# Patient Record
Sex: FEMALE | Race: OTHER | NOT HISPANIC OR LATINO | URBAN - METROPOLITAN AREA
[De-identification: names, ages, dates, MRNs, and addresses within clinical notes are randomized per-mention and may not be internally consistent; named-entity substitution may affect disease eponyms.]

---

## 2022-03-04 ENCOUNTER — INPATIENT (INPATIENT)
Facility: HOSPITAL | Age: 52
LOS: 6 days | Discharge: HOME | End: 2022-03-11
Attending: INTERNAL MEDICINE | Admitting: INTERNAL MEDICINE
Payer: COMMERCIAL

## 2022-03-04 VITALS
TEMPERATURE: 98 F | DIASTOLIC BLOOD PRESSURE: 80 MMHG | RESPIRATION RATE: 18 BRPM | SYSTOLIC BLOOD PRESSURE: 157 MMHG | WEIGHT: 179.9 LBS | HEIGHT: 63 IN | HEART RATE: 86 BPM

## 2022-03-04 LAB
ALBUMIN SERPL ELPH-MCNC: 4.1 G/DL — SIGNIFICANT CHANGE UP (ref 3.3–5)
ALP SERPL-CCNC: 108 U/L — SIGNIFICANT CHANGE UP (ref 40–120)
ALT FLD-CCNC: 32 U/L — SIGNIFICANT CHANGE UP (ref 12–78)
ANION GAP SERPL CALC-SCNC: 7 MMOL/L — SIGNIFICANT CHANGE UP (ref 5–17)
AST SERPL-CCNC: 23 U/L — SIGNIFICANT CHANGE UP (ref 15–37)
BASOPHILS # BLD AUTO: 0.03 K/UL — SIGNIFICANT CHANGE UP (ref 0–0.2)
BASOPHILS NFR BLD AUTO: 0.3 % — SIGNIFICANT CHANGE UP (ref 0–2)
BILIRUB SERPL-MCNC: 0.7 MG/DL — SIGNIFICANT CHANGE UP (ref 0.2–1.2)
BUN SERPL-MCNC: 12 MG/DL — SIGNIFICANT CHANGE UP (ref 7–23)
CALCIUM SERPL-MCNC: 10.3 MG/DL — HIGH (ref 8.5–10.1)
CHLORIDE SERPL-SCNC: 103 MMOL/L — SIGNIFICANT CHANGE UP (ref 96–108)
CO2 SERPL-SCNC: 27 MMOL/L — SIGNIFICANT CHANGE UP (ref 22–31)
CREAT SERPL-MCNC: 0.71 MG/DL — SIGNIFICANT CHANGE UP (ref 0.5–1.3)
EGFR: 104 ML/MIN/1.73M2 — SIGNIFICANT CHANGE UP
EOSINOPHIL # BLD AUTO: 0.02 K/UL — SIGNIFICANT CHANGE UP (ref 0–0.5)
EOSINOPHIL NFR BLD AUTO: 0.2 % — SIGNIFICANT CHANGE UP (ref 0–6)
FLUAV AG NPH QL: SIGNIFICANT CHANGE UP
FLUBV AG NPH QL: SIGNIFICANT CHANGE UP
GLUCOSE BLDC GLUCOMTR-MCNC: 231 MG/DL — HIGH (ref 70–99)
GLUCOSE SERPL-MCNC: 266 MG/DL — HIGH (ref 70–99)
HCG SERPL-ACNC: <1 MIU/ML — SIGNIFICANT CHANGE UP
HCT VFR BLD CALC: 43 % — SIGNIFICANT CHANGE UP (ref 34.5–45)
HGB BLD-MCNC: 14.3 G/DL — SIGNIFICANT CHANGE UP (ref 11.5–15.5)
IMM GRANULOCYTES NFR BLD AUTO: 0.7 % — SIGNIFICANT CHANGE UP (ref 0–1.5)
LACTATE SERPL-SCNC: 1.2 MMOL/L — SIGNIFICANT CHANGE UP (ref 0.7–2)
LACTATE SERPL-SCNC: 2.3 MMOL/L — HIGH (ref 0.7–2)
LIDOCAIN IGE QN: 64 U/L — LOW (ref 73–393)
LYMPHOCYTES # BLD AUTO: 1.8 K/UL — SIGNIFICANT CHANGE UP (ref 1–3.3)
LYMPHOCYTES # BLD AUTO: 18.4 % — SIGNIFICANT CHANGE UP (ref 13–44)
MCHC RBC-ENTMCNC: 27.7 PG — SIGNIFICANT CHANGE UP (ref 27–34)
MCHC RBC-ENTMCNC: 33.3 G/DL — SIGNIFICANT CHANGE UP (ref 32–36)
MCV RBC AUTO: 83.2 FL — SIGNIFICANT CHANGE UP (ref 80–100)
MONOCYTES # BLD AUTO: 0.41 K/UL — SIGNIFICANT CHANGE UP (ref 0–0.9)
MONOCYTES NFR BLD AUTO: 4.2 % — SIGNIFICANT CHANGE UP (ref 2–14)
NEUTROPHILS # BLD AUTO: 7.47 K/UL — HIGH (ref 1.8–7.4)
NEUTROPHILS NFR BLD AUTO: 76.2 % — SIGNIFICANT CHANGE UP (ref 43–77)
NRBC # BLD: 0 /100 WBCS — SIGNIFICANT CHANGE UP (ref 0–0)
PLATELET # BLD AUTO: 356 K/UL — SIGNIFICANT CHANGE UP (ref 150–400)
POTASSIUM SERPL-MCNC: 3.7 MMOL/L — SIGNIFICANT CHANGE UP (ref 3.5–5.3)
POTASSIUM SERPL-SCNC: 3.7 MMOL/L — SIGNIFICANT CHANGE UP (ref 3.5–5.3)
PROT SERPL-MCNC: 8.4 GM/DL — HIGH (ref 6–8.3)
RBC # BLD: 5.17 M/UL — SIGNIFICANT CHANGE UP (ref 3.8–5.2)
RBC # FLD: 12.7 % — SIGNIFICANT CHANGE UP (ref 10.3–14.5)
SARS-COV-2 RNA SPEC QL NAA+PROBE: SIGNIFICANT CHANGE UP
SODIUM SERPL-SCNC: 137 MMOL/L — SIGNIFICANT CHANGE UP (ref 135–145)
WBC # BLD: 9.8 K/UL — SIGNIFICANT CHANGE UP (ref 3.8–10.5)
WBC # FLD AUTO: 9.8 K/UL — SIGNIFICANT CHANGE UP (ref 3.8–10.5)

## 2022-03-04 PROCEDURE — 99223 1ST HOSP IP/OBS HIGH 75: CPT

## 2022-03-04 PROCEDURE — 99285 EMERGENCY DEPT VISIT HI MDM: CPT

## 2022-03-04 PROCEDURE — 93010 ELECTROCARDIOGRAM REPORT: CPT

## 2022-03-04 PROCEDURE — 76700 US EXAM ABDOM COMPLETE: CPT | Mod: 26

## 2022-03-04 PROCEDURE — 74177 CT ABD & PELVIS W/CONTRAST: CPT | Mod: 26,MA

## 2022-03-04 RX ORDER — ACETAMINOPHEN 500 MG
650 TABLET ORAL ONCE
Refills: 0 | Status: COMPLETED | OUTPATIENT
Start: 2022-03-04 | End: 2022-03-04

## 2022-03-04 RX ORDER — SODIUM CHLORIDE 9 MG/ML
1000 INJECTION, SOLUTION INTRAVENOUS
Refills: 0 | Status: DISCONTINUED | OUTPATIENT
Start: 2022-03-04 | End: 2022-03-05

## 2022-03-04 RX ORDER — MORPHINE SULFATE 50 MG/1
4 CAPSULE, EXTENDED RELEASE ORAL ONCE
Refills: 0 | Status: DISCONTINUED | OUTPATIENT
Start: 2022-03-04 | End: 2022-03-04

## 2022-03-04 RX ORDER — ONDANSETRON 8 MG/1
4 TABLET, FILM COATED ORAL ONCE
Refills: 0 | Status: COMPLETED | OUTPATIENT
Start: 2022-03-04 | End: 2022-03-04

## 2022-03-04 RX ORDER — SODIUM CHLORIDE 9 MG/ML
1000 INJECTION INTRAMUSCULAR; INTRAVENOUS; SUBCUTANEOUS ONCE
Refills: 0 | Status: COMPLETED | OUTPATIENT
Start: 2022-03-04 | End: 2022-03-04

## 2022-03-04 RX ORDER — IBUPROFEN 200 MG
400 TABLET ORAL ONCE
Refills: 0 | Status: COMPLETED | OUTPATIENT
Start: 2022-03-04 | End: 2022-03-04

## 2022-03-04 RX ORDER — FAMOTIDINE 10 MG/ML
20 INJECTION INTRAVENOUS ONCE
Refills: 0 | Status: COMPLETED | OUTPATIENT
Start: 2022-03-04 | End: 2022-03-04

## 2022-03-04 RX ORDER — PANTOPRAZOLE SODIUM 20 MG/1
40 TABLET, DELAYED RELEASE ORAL DAILY
Refills: 0 | Status: DISCONTINUED | OUTPATIENT
Start: 2022-03-04 | End: 2022-03-07

## 2022-03-04 RX ORDER — ONDANSETRON 8 MG/1
4 TABLET, FILM COATED ORAL EVERY 6 HOURS
Refills: 0 | Status: DISCONTINUED | OUTPATIENT
Start: 2022-03-04 | End: 2022-03-07

## 2022-03-04 RX ORDER — NICOTINE POLACRILEX 2 MG
1 GUM BUCCAL DAILY
Refills: 0 | Status: DISCONTINUED | OUTPATIENT
Start: 2022-03-04 | End: 2022-03-07

## 2022-03-04 RX ADMIN — SODIUM CHLORIDE 1000 MILLILITER(S): 9 INJECTION INTRAMUSCULAR; INTRAVENOUS; SUBCUTANEOUS at 15:43

## 2022-03-04 RX ADMIN — ONDANSETRON 4 MILLIGRAM(S): 8 TABLET, FILM COATED ORAL at 20:33

## 2022-03-04 RX ADMIN — MORPHINE SULFATE 4 MILLIGRAM(S): 50 CAPSULE, EXTENDED RELEASE ORAL at 16:41

## 2022-03-04 RX ADMIN — Medication 20 MILLIGRAM(S): at 18:59

## 2022-03-04 RX ADMIN — PANTOPRAZOLE SODIUM 40 MILLIGRAM(S): 20 TABLET, DELAYED RELEASE ORAL at 20:33

## 2022-03-04 RX ADMIN — Medication 400 MILLIGRAM(S): at 23:48

## 2022-03-04 RX ADMIN — Medication 400 MILLIGRAM(S): at 22:48

## 2022-03-04 RX ADMIN — SODIUM CHLORIDE 1000 MILLILITER(S): 9 INJECTION INTRAMUSCULAR; INTRAVENOUS; SUBCUTANEOUS at 14:18

## 2022-03-04 RX ADMIN — MORPHINE SULFATE 4 MILLIGRAM(S): 50 CAPSULE, EXTENDED RELEASE ORAL at 15:50

## 2022-03-04 RX ADMIN — MORPHINE SULFATE 4 MILLIGRAM(S): 50 CAPSULE, EXTENDED RELEASE ORAL at 14:18

## 2022-03-04 RX ADMIN — SODIUM CHLORIDE 125 MILLILITER(S): 9 INJECTION, SOLUTION INTRAVENOUS at 20:01

## 2022-03-04 RX ADMIN — ONDANSETRON 4 MILLIGRAM(S): 8 TABLET, FILM COATED ORAL at 14:17

## 2022-03-04 RX ADMIN — FAMOTIDINE 20 MILLIGRAM(S): 10 INJECTION INTRAVENOUS at 17:02

## 2022-03-04 NOTE — ED PROVIDER NOTE - ATTENDING CONTRIBUTION TO CARE
jasen is presenting with sharp upper abdominal pain for 1 day assoc with vomiting. intermittently has had this pain for years. no diarrhea or constipation. uncomfortable appearing on exam, moderate ewpigastric ttp. CT obtained showing gallstones, US shows the same. no ruq ttp. abdominal pain is uncontorlled in ED, will require admission for intractable abdominal painl. no laboratory or clinial signs of acute cholcystitis

## 2022-03-04 NOTE — ED ADULT NURSE REASSESSMENT NOTE - NS ED NURSE REASSESS COMMENT FT1
Assuming patient's care for coverage. Report received from RN and patient informed during rounding. Assessment available on KB. Will continue to monitor patient denies pregnancy at this time

## 2022-03-04 NOTE — ED PROVIDER NOTE - CLINICAL SUMMARY MEDICAL DECISION MAKING FREE TEXT BOX
52yo female with pmh of gastritis presents complanining of sudden onset sharp epigastric pain associated with n/v, po intolerance since this AM. Tearful, in apparent pain. Vitals stables. Exam only sig for epigastric ttp. Will get labs and CTAP. Will give pain control and antiemetics. Dispo pending work up

## 2022-03-04 NOTE — H&P ADULT - ASSESSMENT
50 yo female PMH of gastritis presents complanining of sudden onset sharp epigastric pain associated with n/v, poor po intolerance since this AM. Reports she woke up and felt fine but then after breakfast abdominal pain began. Pain was unbearable prompting ED visit. Denies chest pain, bloody emesis, changes in BM, fevers/chills, sob, dizziness, loc and other assocaited symptoms. LBM this morning, normal, no blood or melena.     Plan: Admit to medicine for abdominal pain. Clear liquid diet for now, IVF LR at 125/h. Lactate elevated on arrival at 2.3 likely from dehydration,   repeat in AM. CT abdomen WNL, no acute findings. Abdominal sonogram notes gallstones, no GB sludge, Gallego sign could not be evaluated.   Will order HIDA scan for AM r/o biliary colick. Labs and LFT all WNL, no indication for ABX.     Glucose 233 on arrival, no history of DM. Follow finger stick AC and HS, A1C in AM.    52 yo female PMH of gastritis presents complanining of sudden onset sharp epigastric pain associated with n/v, poor po intolerance since this AM. Reports she woke up and felt fine but then after breakfast abdominal pain began. Pain was unbearable prompting ED visit. Denies chest pain, bloody emesis, changes in BM, fevers/chills, sob, dizziness, loc and other assocaited symptoms. LBM this morning, normal, no blood or melena.     Plan: Admit to medicine for abdominal pain possible gastroenteritis. Clear liquid diet for now, IVF LR at 125/h, prn Zofran. Lactate elevated on arrival at 2.3 likely from dehydration, repeat lactate in AM.     CT abdomen WNL, no acute findings. Abdominal sonogram notes gallstones, no GB sludge, Gallego sign could not be evaluated. Will order HIDA scan for AM r/o chronic  cholycyctitis. Labs and LFT all WNL, no indication for ABX presently. IV Protonix for abdominal discomfort, might have GERD component.      Glucose 233 on arrival, no history of DM. Follow finger stick AC and HS, A1C in AM.

## 2022-03-04 NOTE — ED ADULT NURSE REASSESSMENT NOTE - NS ED NURSE REASSESS COMMENT FT1
Pt. medicated again with Morphine for abd pain. Pt. transported to US. When pt. returns repeat Lactate to be drawn.

## 2022-03-04 NOTE — H&P ADULT - NSHPLABSRESULTS_GEN_ALL_CORE
LABS:                        14.3   9.80  )-----------( 356      ( 04 Mar 2022 14:39 )             43.0     03-04    137  |  103  |  12  ----------------------------<  266<H>  3.7   |  27  |  0.71    Ca    10.3<H>      04 Mar 2022 14:39    TPro  8.4<H>  /  Alb  4.1  /  TBili  0.7  /  DBili  x   /  AST  23  /  ALT  32  /  AlkPhos  108  03-04            RADIOLOGY & ADDITIONAL TESTS:

## 2022-03-04 NOTE — H&P ADULT - HISTORY OF PRESENT ILLNESS
52 yo female PMH of gastritis presents complanining of sudden onset sharp epigastric pain associated with n/v, poor po intolerance since this AM. Reports she woke up and felt fine but then after breakfast it suddenly began. Pain was unbearable prompting ED visit. Denies chest pain, bloody emesis, changes in BM, fevers/chills, sob, dizziness, loc and other assocaited symptoms. LBM this morning, normal, no blood or melena.  52 yo female PMH of gastritis presents complanining of sudden onset sharp epigastric pain associated with n/v, poor po intolerance since this AM. Reports she woke up and felt fine but then after breakfast abdominal pain began. Pain was unbearable prompting ED visit. Denies chest pain, bloody emesis, changes in BM, fevers/chills, sob, dizziness, loc and other assocaited symptoms. LBM this morning, normal, no blood or melena.

## 2022-03-04 NOTE — ED PROVIDER NOTE - NS ED ROS FT
Constitutional: (-) Fever, (-) Anorexia, (-) Generalized Malaise  Eyes: (-)Discharge, (-) Irritation,  (-) Visual changes  EARS: (-) Ear Pain, (-) Apparent hearing changes  NOSE: (-) Congestion, (-) Bloody nose  MOUTH/THROAT: (-) Vocal Changes, (-) Drooling, (-) Sore throat  NECK: (-) Lumps, (-) Stiffness, (-) Pain  CV: (-) Chest Pain, (-) Palpitations, (-) Edema   RESP:  (-) Cough, (-) SOB, (-) JIMENEZ,  (-) Wheezing  GI: (+) Nausea, (+) Vomiting, (+) Abdominal Pain, (-) Diarrhea, (-) Constipation, (-) Bloody stools  : (-) Dysuria, (-) Frequency, (-) Hematuria, (-) Incontinence  MSK: (-) Joint Pain, (-) Back Pain, (-) Deformities  SKIN: (-) Wounds, (-) Color change, (-)Rash, (-) Swelling  NEURO:(-) Headache, (-) Dizziness, (-) Numbness/Tingling,  (-)LOC

## 2022-03-04 NOTE — ED PROVIDER NOTE - OBJECTIVE STATEMENT
52yo female with pmh of gastritis presents complanining of sudden onset sharp epigastric pain associated with n/v, po intolerance since this AM. Reports she woke up and felt fine but then after breakfast it suddenly began. Pain was unbearable prompting ED visit. Denies chest pain, bloody emesis, changes in BM, fevers/chills, sob, dizziness, loc and other assocaited sx. PSx: c section. LBM this morning, normal.

## 2022-03-04 NOTE — ED PROVIDER NOTE - PHYSICAL EXAMINATION
PE:   GEN: Awake, alert, interactive, tearful in apparent pain   HEAD AND NECK: NC/AT. Airway patent. Neck supple.   EYES: Clear b/l. PERRL  CARDIAC: RRR. S1, S2. No evident pedal edema.    RESP: Normal respiratory effort with no use of accessory muscles or retractions. Clear throughout on auscultation.  ABD: soft, non-distended, +epigastric ttp. No rebound, no guarding.   NEURO: AOx3, CN II-XII grossly intact, no focal deficits.   MSK: Moving all extremities with no apparent deformities.   SKIN: Warm, dry, intact normal color

## 2022-03-04 NOTE — H&P ADULT - NSHPPHYSICALEXAM_GEN_ALL_CORE
PHYSICAL EXAMINATION:  Vital Signs Last 24 Hrs  T(C): 36.9 (04 Mar 2022 19:27), Max: 36.9 (04 Mar 2022 19:27)  T(F): 98.4 (04 Mar 2022 19:27), Max: 98.4 (04 Mar 2022 19:27)  HR: 81 (04 Mar 2022 19:27) (81 - 86)  BP: 148/91 (04 Mar 2022 19:27) (138/72 - 157/80)  BP(mean): --  RR: 16 (04 Mar 2022 19:27) (16 - 18)  SpO2: 97% (04 Mar 2022 19:27) (97% - 97%)  CAPILLARY BLOOD GLUCOSE          GENERAL: NAD, seen in ER on 1-D, stable, some nausea, no abdominal pain.   HEAD:  atraumatic, normocephalic  EYES: sclera anicteric  ENMT: mucous membranes moist  NECK: supple, No JVD  CHEST/LUNG: clear to auscultation bilaterally; no rales, rhonchi, or wheezing b/l  HEART: normal S1, S2  ABDOMEN: BS+, soft, ND, NT   EXTREMITIES:  pulses palpable; no clubbing, cyanosis, or edema b/l LEs  NEURO: awake, alert, interactive; moves all extremities  SKIN: no rashes or lesions

## 2022-03-05 LAB
A1C WITH ESTIMATED AVERAGE GLUCOSE RESULT: 10.1 % — HIGH (ref 4–5.6)
ANION GAP SERPL CALC-SCNC: 7 MMOL/L — SIGNIFICANT CHANGE UP (ref 5–17)
BUN SERPL-MCNC: 7 MG/DL — SIGNIFICANT CHANGE UP (ref 7–23)
CALCIUM SERPL-MCNC: 8.8 MG/DL — SIGNIFICANT CHANGE UP (ref 8.5–10.1)
CHLORIDE SERPL-SCNC: 103 MMOL/L — SIGNIFICANT CHANGE UP (ref 96–108)
CO2 SERPL-SCNC: 26 MMOL/L — SIGNIFICANT CHANGE UP (ref 22–31)
CREAT SERPL-MCNC: 0.56 MG/DL — SIGNIFICANT CHANGE UP (ref 0.5–1.3)
EGFR: 112 ML/MIN/1.73M2 — SIGNIFICANT CHANGE UP
ESTIMATED AVERAGE GLUCOSE: 243 MG/DL — HIGH (ref 68–114)
GLUCOSE BLDC GLUCOMTR-MCNC: 171 MG/DL — HIGH (ref 70–99)
GLUCOSE BLDC GLUCOMTR-MCNC: 183 MG/DL — HIGH (ref 70–99)
GLUCOSE BLDC GLUCOMTR-MCNC: 212 MG/DL — HIGH (ref 70–99)
GLUCOSE BLDC GLUCOMTR-MCNC: 297 MG/DL — HIGH (ref 70–99)
GLUCOSE SERPL-MCNC: 196 MG/DL — HIGH (ref 70–99)
LACTATE SERPL-SCNC: 1.2 MMOL/L — SIGNIFICANT CHANGE UP (ref 0.7–2)
POTASSIUM SERPL-MCNC: 3.3 MMOL/L — LOW (ref 3.5–5.3)
POTASSIUM SERPL-SCNC: 3.3 MMOL/L — LOW (ref 3.5–5.3)
SODIUM SERPL-SCNC: 136 MMOL/L — SIGNIFICANT CHANGE UP (ref 135–145)

## 2022-03-05 PROCEDURE — 71045 X-RAY EXAM CHEST 1 VIEW: CPT | Mod: 26

## 2022-03-05 PROCEDURE — 99233 SBSQ HOSP IP/OBS HIGH 50: CPT

## 2022-03-05 RX ORDER — DEXTROSE 50 % IN WATER 50 %
12.5 SYRINGE (ML) INTRAVENOUS ONCE
Refills: 0 | Status: DISCONTINUED | OUTPATIENT
Start: 2022-03-05 | End: 2022-03-07

## 2022-03-05 RX ORDER — PIPERACILLIN AND TAZOBACTAM 4; .5 G/20ML; G/20ML
3.38 INJECTION, POWDER, LYOPHILIZED, FOR SOLUTION INTRAVENOUS ONCE
Refills: 0 | Status: COMPLETED | OUTPATIENT
Start: 2022-03-05 | End: 2022-03-05

## 2022-03-05 RX ORDER — SODIUM CHLORIDE 9 MG/ML
1000 INJECTION, SOLUTION INTRAVENOUS
Refills: 0 | Status: DISCONTINUED | OUTPATIENT
Start: 2022-03-05 | End: 2022-03-07

## 2022-03-05 RX ORDER — INSULIN GLARGINE 100 [IU]/ML
6 INJECTION, SOLUTION SUBCUTANEOUS AT BEDTIME
Refills: 0 | Status: DISCONTINUED | OUTPATIENT
Start: 2022-03-05 | End: 2022-03-06

## 2022-03-05 RX ORDER — HEPARIN SODIUM 5000 [USP'U]/ML
7500 INJECTION INTRAVENOUS; SUBCUTANEOUS EVERY 12 HOURS
Refills: 0 | Status: DISCONTINUED | OUTPATIENT
Start: 2022-03-05 | End: 2022-03-07

## 2022-03-05 RX ORDER — PIPERACILLIN AND TAZOBACTAM 4; .5 G/20ML; G/20ML
3.38 INJECTION, POWDER, LYOPHILIZED, FOR SOLUTION INTRAVENOUS EVERY 8 HOURS
Refills: 0 | Status: DISCONTINUED | OUTPATIENT
Start: 2022-03-06 | End: 2022-03-07

## 2022-03-05 RX ORDER — DEXTROSE 50 % IN WATER 50 %
25 SYRINGE (ML) INTRAVENOUS ONCE
Refills: 0 | Status: DISCONTINUED | OUTPATIENT
Start: 2022-03-05 | End: 2022-03-07

## 2022-03-05 RX ORDER — ACETAMINOPHEN 500 MG
650 TABLET ORAL EVERY 6 HOURS
Refills: 0 | Status: DISCONTINUED | OUTPATIENT
Start: 2022-03-05 | End: 2022-03-07

## 2022-03-05 RX ORDER — GLUCAGON INJECTION, SOLUTION 0.5 MG/.1ML
1 INJECTION, SOLUTION SUBCUTANEOUS ONCE
Refills: 0 | Status: DISCONTINUED | OUTPATIENT
Start: 2022-03-05 | End: 2022-03-07

## 2022-03-05 RX ORDER — DEXTROSE 50 % IN WATER 50 %
15 SYRINGE (ML) INTRAVENOUS ONCE
Refills: 0 | Status: DISCONTINUED | OUTPATIENT
Start: 2022-03-05 | End: 2022-03-07

## 2022-03-05 RX ORDER — INSULIN LISPRO 100/ML
VIAL (ML) SUBCUTANEOUS
Refills: 0 | Status: DISCONTINUED | OUTPATIENT
Start: 2022-03-05 | End: 2022-03-07

## 2022-03-05 RX ORDER — POTASSIUM CHLORIDE 20 MEQ
10 PACKET (EA) ORAL THREE TIMES A DAY
Refills: 0 | Status: COMPLETED | OUTPATIENT
Start: 2022-03-05 | End: 2022-03-06

## 2022-03-05 RX ADMIN — INSULIN GLARGINE 6 UNIT(S): 100 INJECTION, SOLUTION SUBCUTANEOUS at 21:55

## 2022-03-05 RX ADMIN — HEPARIN SODIUM 7500 UNIT(S): 5000 INJECTION INTRAVENOUS; SUBCUTANEOUS at 18:19

## 2022-03-05 RX ADMIN — PANTOPRAZOLE SODIUM 40 MILLIGRAM(S): 20 TABLET, DELAYED RELEASE ORAL at 12:20

## 2022-03-05 RX ADMIN — Medication 1 PATCH: at 16:34

## 2022-03-05 RX ADMIN — Medication 1 PATCH: at 20:49

## 2022-03-05 RX ADMIN — Medication 2: at 16:34

## 2022-03-05 RX ADMIN — SODIUM CHLORIDE 100 MILLILITER(S): 9 INJECTION, SOLUTION INTRAVENOUS at 12:24

## 2022-03-05 RX ADMIN — Medication 10 MILLIEQUIVALENT(S): at 14:24

## 2022-03-05 RX ADMIN — Medication 10 MILLIEQUIVALENT(S): at 21:55

## 2022-03-05 RX ADMIN — Medication 650 MILLIGRAM(S): at 13:20

## 2022-03-05 RX ADMIN — PIPERACILLIN AND TAZOBACTAM 25 GRAM(S): 4; .5 INJECTION, POWDER, LYOPHILIZED, FOR SOLUTION INTRAVENOUS at 23:14

## 2022-03-05 RX ADMIN — Medication 3: at 12:18

## 2022-03-05 RX ADMIN — Medication 650 MILLIGRAM(S): at 12:19

## 2022-03-05 RX ADMIN — SODIUM CHLORIDE 100 MILLILITER(S): 9 INJECTION, SOLUTION INTRAVENOUS at 21:54

## 2022-03-05 RX ADMIN — PIPERACILLIN AND TAZOBACTAM 200 GRAM(S): 4; .5 INJECTION, POWDER, LYOPHILIZED, FOR SOLUTION INTRAVENOUS at 16:34

## 2022-03-05 NOTE — PROGRESS NOTE ADULT - ASSESSMENT
50 yo female PMH of "gastritis" presents complanining of sudden onset sharp epigastric pain associated with n/v, poor po intolerance since this AM. Reports she woke up and felt fine but then after breakfast abdominal pain began. Pain was unbearable prompting ED visit. Denies chest pain, bloody emesis, changes in BM, fevers/chills, sob, dizziness, loc and other assocaited symptoms. LBM this morning, normal, no blood or melena.     Abdominal pain -- chronic, but with marked recent increase    CT of abdomen read as normal, but US shows gallstones  Plan: Admit to medicine for abdominal pain possible gastroenteritis. Clear liquid diet for now, IVF LR at 125/h, prn Zofran. Lactate elevated on arrival at 2.3 likely from dehydration, repeat lactate in AM.     CT abdomen WNL, no acute findings. Abdominal sonogram notes gallstones, no GB sludge, Gallego sign could not be evaluated. Will order HIDA scan for AM r/o chronic  cholycyctitis. Labs and LFT all WNL, no indication for ABX presently. IV Protonix for abdominal discomfort, might have GERD component.      Glucose 233 on arrival, no history of DM. Follow finger stick AC and HS, A1C in AM.       Preop Evaluation    *Cardiovascular    No history of coronary disease; EKG shows SR at 76; no ischemia    Blood pressure normal    Activity level: "normal"; no chest pain or excessive SOB w/ activity    *Pulmonary    No Hx of COPD or Asthma    Non-smoker    CXR ordered/pending    *Electrolytes    Potassium sl. low at 3.3, being repleted...    Other electrolytes normal    *Endocrine    No known prior history of diabetes, but labwork confirms new Dx.    Add low dose Lantus (6 units when NPO; 10 units once diet restarted) + SSI    *Renal           EGFR normal    *Coagulation/Hematology    PT, PTT ordered/pending    No known history of Bleeding/Clotting issues    H/H normal    *Anesthesia issues w/ prior surgery?    Prior surgery includes: ***           Had no issues w/ anesthesia, excessive bleeding or excessive clotting. ***    No contraindications to surgery; Pt is medically optimized for the planned procedure; OK to proceed.***     50 yo female PMH of "gastritis" presents complanining of sudden onset sharp epigastric pain associated with n/v, poor po intolerance since this AM. Reports she woke up and felt fine but then after breakfast abdominal pain began. Pain was unbearable prompting ED visit. Denies chest pain, bloody emesis, changes in BM, fevers/chills, sob, dizziness, loc and other assocaited symptoms. LBM this morning, normal, no blood or melena.     Abdominal pain -- chronic, but with marked recent increase    CT of abdomen read as normal, but US shows gallstones    Seen by surgery (Kayli) who feels this is consistent w/ acute on chronic cholecystitis    Lap Tana planned for 3/7    Zosyn added    Diabetes type 2 -- new diagnosis    A1c = 10.1    Lantus 6 units + SSI    Can hold while npo    At discharge, can probably transition to oral med such as metformin    Preop Evaluation    *Cardiovascular    No history of coronary disease; EKG shows SR at 76; no ischemia    Blood pressure normal    Activity level: "normal"; no chest pain or excessive SOB w/ activity    *Pulmonary    No Hx of COPD or Asthma    Smokes 6-7 cigs/day for 20 years    CXR ordered/pending    *Electrolytes    Potassium sl. low at 3.3, being repleted...    Other electrolytes normal    *Endocrine    No known prior history of diabetes, but labwork confirms new Dx.    Added low dose Lantus (6 units for now; can hold when NPO) + SSI    *Renal      EGFR normal    *Coagulation/Hematology    PT, PTT ordered/pending    No known history of Bleeding/Clotting issues    H/H normal    *Anesthesia issues w/ prior surgery?    Prior surgery includes: NONE    Assuming the above CXR and coags are reasonable, then there will be no contraindications to surgery; Pt will be deemed medically optimized for the planned procedure.     50 yo female PMH of "gastritis" presents complanining of sudden onset sharp epigastric pain associated with n/v, poor po intolerance since this AM. Reports she woke up and felt fine but then after breakfast abdominal pain began. Pain was unbearable prompting ED visit. Denies chest pain, bloody emesis, changes in BM, fevers/chills, sob, dizziness, loc and other assocaited symptoms. LBM this morning, normal, no blood or melena.     Abdominal pain -- chronic, but with marked recent increase    CT of abdomen read as normal, but US shows gallstones    Seen by surgery (Kayli) who feels this is consistent w/ acute on chronic cholecystitis    Lap Tana planned for 3/7    Zosyn added    Diabetes type 2 -- new diagnosis    A1c = 10.1    Lantus 6 units + SSI    Can hold while npo    At discharge, can probably transition to oral med such as metformin    Hypokalemia    Being replaced po    Preop Evaluation    *Cardiovascular    No history of coronary disease; EKG shows SR at 76; no ischemia    Blood pressure normal    Activity level: "normal"; no chest pain or excessive SOB w/ activity    *Pulmonary    No Hx of COPD or Asthma    Smokes 6-7 cigs/day for 20 years    CXR ordered/pending    *Electrolytes    Potassium sl. low at 3.3, being repleted...    Other electrolytes normal    *Endocrine    No known prior history of diabetes, but labwork confirms new Dx.    Added low dose Lantus (6 units for now; can hold when NPO) + SSI    *Renal      EGFR normal    *Coagulation/Hematology    PT, PTT ordered/pending    No known history of Bleeding/Clotting issues    H/H normal    *Anesthesia issues w/ prior surgery?    Prior surgery includes: NONE    Assuming the above CXR and coags are reasonable, then there will be no contraindications to surgery; Pt will be deemed medically optimized for the planned procedure.

## 2022-03-05 NOTE — PROGRESS NOTE ADULT - SUBJECTIVE AND OBJECTIVE BOX
Patient is a 51y old  Female who presents with a chief complaint of Abdominal pain and nausea. (05 Mar 2022 15:31)      INTERVAL HPI/OVERNIGHT EVENTS:    Abdominal pain less pronounced, but still present, intermittent.     REVIEW OF SYSTEMS:   Remaining ROS negative    MEDICATIONS  (STANDING):  dextrose 40% Gel 15 Gram(s) Oral once  dextrose 5%. 1000 milliLiter(s) (50 mL/Hr) IV Continuous <Continuous>  dextrose 5%. 1000 milliLiter(s) (100 mL/Hr) IV Continuous <Continuous>  dextrose 50% Injectable 25 Gram(s) IV Push once  dextrose 50% Injectable 12.5 Gram(s) IV Push once  dextrose 50% Injectable 25 Gram(s) IV Push once  glucagon  Injectable 1 milliGRAM(s) IntraMuscular once  heparin   Injectable 7500 Unit(s) SubCutaneous every 12 hours  insulin lispro (ADMELOG) corrective regimen sliding scale   SubCutaneous three times a day before meals  lactated ringers. 1000 milliLiter(s) (100 mL/Hr) IV Continuous <Continuous>  nicotine -  14 mG/24Hr(s) Patch 1 patch Transdermal daily  pantoprazole  Injectable 40 milliGRAM(s) IV Push daily  piperacillin/tazobactam IVPB.. 3.375 Gram(s) IV Intermittent every 8 hours  potassium chloride    Tablet ER 10 milliEquivalent(s) Oral three times a day    MEDICATIONS  (PRN):  acetaminophen     Tablet .. 650 milliGRAM(s) Oral every 6 hours PRN Temp greater or equal to 38C (100.4F), Mild Pain (1 - 3)  ondansetron Injectable 4 milliGRAM(s) IV Push every 6 hours PRN Nausea and/or Vomiting      Allergies    No Known Allergies    Intolerances        Vital Signs Last 24 Hrs  T(C): 37.2 (05 Mar 2022 11:32), Max: 37.2 (04 Mar 2022 22:20)  T(F): 99 (05 Mar 2022 11:32), Max: 99 (04 Mar 2022 22:20)  HR: 77 (05 Mar 2022 11:32) (77 - 81)  BP: 110/60 (05 Mar 2022 11:32) (110/60 - 148/91)  BP(mean): --  RR: 17 (05 Mar 2022 11:32) (16 - 18)  SpO2: 95% (05 Mar 2022 11:32) (95% - 99%)    PHYSICAL EXAM:  GENERAL: NAD  HEAD:  Atraumatic, Normocephalic  EYES: EOMI, PERRLA, conjunctiva and sclera clear  ENT: O/P Clear  NECK: Supple, No JVD  NERVOUS SYSTEM:  No focal deficits  CHEST/LUNG: Clear to percussion bilaterally; No rales, rhonchi, wheezing  HEART: Regular rate and rhythm; No murmurs, rubs, or gallops  ABDOMEN: Moderately tender in RUQ  EXTREMITIES:  2+ Peripheral Pulses, No clubbing, cyanosis, or edema  SKIN: No rashes or lesions    LABS:                        14.3   9.80  )-----------( 356      ( 04 Mar 2022 14:39 )             43.0     03-05    136  |  103  |  7   ----------------------------<  196<H>  3.3<L>   |  26  |  0.56    Ca    8.8      05 Mar 2022 06:48    TPro  8.4<H>  /  Alb  4.1  /  TBili  0.7  /  DBili  x   /  AST  23  /  ALT  32  /  AlkPhos  108  03-04        CAPILLARY BLOOD GLUCOSE      POCT Blood Glucose.: 212 mg/dL (05 Mar 2022 16:10)  POCT Blood Glucose.: 297 mg/dL (05 Mar 2022 11:52)  POCT Blood Glucose.: 183 mg/dL (05 Mar 2022 08:07)  POCT Blood Glucose.: 231 mg/dL (04 Mar 2022 22:23)      RADIOLOGY & ADDITIONAL TESTS:    Imaging Personally Reviewed:  [ ] YES  [ ] NO    Consultant(s) Notes Reviewed:  [ ] YES  [ ] NO    Care Discussed with Consultants/Other Providers [ ] YES  [ ] NO

## 2022-03-05 NOTE — CONSULT NOTE ADULT - ASSESSMENT
51F with acute cholecystitis.    PMH uncontrolled DM    -clear liquids  -IV fluids  -IV antibiotics  -Pre op for OR once cleared by medical team.    -Discussed with Dr Milan

## 2022-03-06 ENCOUNTER — TRANSCRIPTION ENCOUNTER (OUTPATIENT)
Age: 52
End: 2022-03-06

## 2022-03-06 LAB
ALBUMIN SERPL ELPH-MCNC: 2.7 G/DL — LOW (ref 3.3–5)
ALP SERPL-CCNC: 63 U/L — SIGNIFICANT CHANGE UP (ref 40–120)
ALT FLD-CCNC: 21 U/L — SIGNIFICANT CHANGE UP (ref 12–78)
ANION GAP SERPL CALC-SCNC: 4 MMOL/L — LOW (ref 5–17)
APTT BLD: 31.5 SEC — SIGNIFICANT CHANGE UP (ref 27.5–35.5)
AST SERPL-CCNC: 18 U/L — SIGNIFICANT CHANGE UP (ref 15–37)
BILIRUB SERPL-MCNC: 0.8 MG/DL — SIGNIFICANT CHANGE UP (ref 0.2–1.2)
BLD GP AB SCN SERPL QL: SIGNIFICANT CHANGE UP
BUN SERPL-MCNC: 7 MG/DL — SIGNIFICANT CHANGE UP (ref 7–23)
CALCIUM SERPL-MCNC: 8.4 MG/DL — LOW (ref 8.5–10.1)
CHLORIDE SERPL-SCNC: 107 MMOL/L — SIGNIFICANT CHANGE UP (ref 96–108)
CO2 SERPL-SCNC: 27 MMOL/L — SIGNIFICANT CHANGE UP (ref 22–31)
CREAT SERPL-MCNC: 0.58 MG/DL — SIGNIFICANT CHANGE UP (ref 0.5–1.3)
EGFR: 111 ML/MIN/1.73M2 — SIGNIFICANT CHANGE UP
GLUCOSE BLDC GLUCOMTR-MCNC: 176 MG/DL — HIGH (ref 70–99)
GLUCOSE BLDC GLUCOMTR-MCNC: 188 MG/DL — HIGH (ref 70–99)
GLUCOSE BLDC GLUCOMTR-MCNC: 196 MG/DL — HIGH (ref 70–99)
GLUCOSE BLDC GLUCOMTR-MCNC: 229 MG/DL — HIGH (ref 70–99)
GLUCOSE SERPL-MCNC: 191 MG/DL — HIGH (ref 70–99)
HCT VFR BLD CALC: 36.8 % — SIGNIFICANT CHANGE UP (ref 34.5–45)
HGB BLD-MCNC: 12 G/DL — SIGNIFICANT CHANGE UP (ref 11.5–15.5)
INR BLD: 1.06 RATIO — SIGNIFICANT CHANGE UP (ref 0.88–1.16)
MAGNESIUM SERPL-MCNC: 2 MG/DL — SIGNIFICANT CHANGE UP (ref 1.6–2.6)
MCHC RBC-ENTMCNC: 27.8 PG — SIGNIFICANT CHANGE UP (ref 27–34)
MCHC RBC-ENTMCNC: 32.6 G/DL — SIGNIFICANT CHANGE UP (ref 32–36)
MCV RBC AUTO: 85.4 FL — SIGNIFICANT CHANGE UP (ref 80–100)
NRBC # BLD: 0 /100 WBCS — SIGNIFICANT CHANGE UP (ref 0–0)
PLATELET # BLD AUTO: 284 K/UL — SIGNIFICANT CHANGE UP (ref 150–400)
POTASSIUM SERPL-MCNC: 3.8 MMOL/L — SIGNIFICANT CHANGE UP (ref 3.5–5.3)
POTASSIUM SERPL-SCNC: 3.8 MMOL/L — SIGNIFICANT CHANGE UP (ref 3.5–5.3)
PROT SERPL-MCNC: 6.1 GM/DL — SIGNIFICANT CHANGE UP (ref 6–8.3)
PROTHROM AB SERPL-ACNC: 12.6 SEC — SIGNIFICANT CHANGE UP (ref 10.5–13.4)
RBC # BLD: 4.31 M/UL — SIGNIFICANT CHANGE UP (ref 3.8–5.2)
RBC # FLD: 12.9 % — SIGNIFICANT CHANGE UP (ref 10.3–14.5)
SODIUM SERPL-SCNC: 138 MMOL/L — SIGNIFICANT CHANGE UP (ref 135–145)
WBC # BLD: 10.03 K/UL — SIGNIFICANT CHANGE UP (ref 3.8–10.5)
WBC # FLD AUTO: 10.03 K/UL — SIGNIFICANT CHANGE UP (ref 3.8–10.5)

## 2022-03-06 PROCEDURE — 99232 SBSQ HOSP IP/OBS MODERATE 35: CPT

## 2022-03-06 RX ADMIN — PIPERACILLIN AND TAZOBACTAM 25 GRAM(S): 4; .5 INJECTION, POWDER, LYOPHILIZED, FOR SOLUTION INTRAVENOUS at 16:23

## 2022-03-06 RX ADMIN — PIPERACILLIN AND TAZOBACTAM 25 GRAM(S): 4; .5 INJECTION, POWDER, LYOPHILIZED, FOR SOLUTION INTRAVENOUS at 23:16

## 2022-03-06 RX ADMIN — SODIUM CHLORIDE 100 MILLILITER(S): 9 INJECTION, SOLUTION INTRAVENOUS at 08:16

## 2022-03-06 RX ADMIN — Medication 1 PATCH: at 13:30

## 2022-03-06 RX ADMIN — HEPARIN SODIUM 7500 UNIT(S): 5000 INJECTION INTRAVENOUS; SUBCUTANEOUS at 18:09

## 2022-03-06 RX ADMIN — PANTOPRAZOLE SODIUM 40 MILLIGRAM(S): 20 TABLET, DELAYED RELEASE ORAL at 13:31

## 2022-03-06 RX ADMIN — Medication 10 MILLIEQUIVALENT(S): at 06:28

## 2022-03-06 RX ADMIN — Medication 650 MILLIGRAM(S): at 18:12

## 2022-03-06 RX ADMIN — Medication 2: at 11:55

## 2022-03-06 RX ADMIN — SODIUM CHLORIDE 100 MILLILITER(S): 9 INJECTION, SOLUTION INTRAVENOUS at 20:54

## 2022-03-06 RX ADMIN — Medication 1 PATCH: at 08:19

## 2022-03-06 RX ADMIN — Medication 1: at 08:13

## 2022-03-06 RX ADMIN — HEPARIN SODIUM 7500 UNIT(S): 5000 INJECTION INTRAVENOUS; SUBCUTANEOUS at 06:28

## 2022-03-06 RX ADMIN — Medication 1: at 16:21

## 2022-03-06 RX ADMIN — Medication 1 PATCH: at 18:12

## 2022-03-06 RX ADMIN — Medication 650 MILLIGRAM(S): at 16:24

## 2022-03-06 RX ADMIN — Medication 1 PATCH: at 20:54

## 2022-03-06 RX ADMIN — PIPERACILLIN AND TAZOBACTAM 25 GRAM(S): 4; .5 INJECTION, POWDER, LYOPHILIZED, FOR SOLUTION INTRAVENOUS at 08:14

## 2022-03-06 NOTE — PROGRESS NOTE ADULT - ASSESSMENT
52 yo female PMH of "gastritis" presents complanining of sudden onset sharp epigastric pain associated with n/v, poor po intolerance since this AM. Reports she woke up and felt fine but then after breakfast abdominal pain began. Pain was unbearable prompting ED visit. Denies chest pain, bloody emesis, changes in BM, fevers/chills, sob, dizziness, loc and other assocaited symptoms. LBM this morning, normal, no blood or melena.     Abdominal pain -- chronic, but with marked recent increase    CT of abdomen read as normal, but US shows gallstones    Seen by surgery (Kayli) who feels this is consistent w/ acute on chronic cholecystitis    Lap Tana planned for 3/7    Zosyn added    Diabetes type 2 -- new diagnosis    A1c = 10.1    Lantus 6 units + SSI    Can hold while npo    At discharge, can probably transition to oral med such as metformin    Hypokalemia    Replaced po    Preop Evaluation    *Cardiovascular    No history of coronary disease; EKG shows SR at 76; no ischemia    Blood pressure normal    Activity level: "normal"; no chest pain or excessive SOB w/ activity    *Pulmonary    No Hx of COPD or Asthma    Smokes 6-7 cigs/day for 20 years    CXR shows some atelectasis; otherwise OK    *Electrolytes    Potassium replated; now normal    Other electrolytes normal    *Endocrine    No known prior history of diabetes, but labwork confirms new Dx.    Adequately controlled on low-dose insulins    *Renal      EGFR normal    *Coagulation/Hematology    PT, PTT normal    No known history of Bleeding/Clotting issues    H/H normal    *Anesthesia issues w/ prior surgery?    Prior surgery includes: NONE    There are no contraindications to surgery; Pt deemed medically optimized for the planned procedure. OK to proceed.      Disp: likely home post surgery and appropriate period of recovery.

## 2022-03-06 NOTE — PROGRESS NOTE ADULT - SUBJECTIVE AND OBJECTIVE BOX
Patient is a 51y old  Female who presents with a chief complaint of Abdominal pain and nausea. (06 Mar 2022 10:31)      INTERVAL HPI/OVERNIGHT EVENTS:    No abdominal pain at rest, but tender to touch.     REVIEW OF SYSTEMS:   Remaining ROS negative    MEDICATIONS  (STANDING):  dextrose 40% Gel 15 Gram(s) Oral once  dextrose 5%. 1000 milliLiter(s) (50 mL/Hr) IV Continuous <Continuous>  dextrose 5%. 1000 milliLiter(s) (100 mL/Hr) IV Continuous <Continuous>  dextrose 50% Injectable 25 Gram(s) IV Push once  dextrose 50% Injectable 12.5 Gram(s) IV Push once  dextrose 50% Injectable 25 Gram(s) IV Push once  glucagon  Injectable 1 milliGRAM(s) IntraMuscular once  heparin   Injectable 7500 Unit(s) SubCutaneous every 12 hours  insulin glargine Injectable (LANTUS) 6 Unit(s) SubCutaneous at bedtime  insulin lispro (ADMELOG) corrective regimen sliding scale   SubCutaneous three times a day before meals  lactated ringers. 1000 milliLiter(s) (100 mL/Hr) IV Continuous <Continuous>  nicotine -  14 mG/24Hr(s) Patch 1 patch Transdermal daily  pantoprazole  Injectable 40 milliGRAM(s) IV Push daily  piperacillin/tazobactam IVPB.. 3.375 Gram(s) IV Intermittent every 8 hours    MEDICATIONS  (PRN):  acetaminophen     Tablet .. 650 milliGRAM(s) Oral every 6 hours PRN Temp greater or equal to 38C (100.4F), Mild Pain (1 - 3)  ondansetron Injectable 4 milliGRAM(s) IV Push every 6 hours PRN Nausea and/or Vomiting      Allergies    No Known Allergies    Intolerances        Vital Signs Last 24 Hrs  T(C): 37.2 (06 Mar 2022 11:48), Max: 37.2 (06 Mar 2022 11:48)  T(F): 98.9 (06 Mar 2022 11:48), Max: 98.9 (06 Mar 2022 11:48)  HR: 81 (06 Mar 2022 11:48) (69 - 81)  BP: 102/71 (06 Mar 2022 11:48) (100/63 - 105/71)  BP(mean): --  RR: 18 (06 Mar 2022 11:48) (17 - 18)  SpO2: 96% (06 Mar 2022 11:48) (95% - 96%)    PHYSICAL EXAM:  GENERAL: NAD  HEAD:  Atraumatic, Normocephalic  EYES: EOMI, PERRLA, conjunctiva and sclera clear  ENT: O/P Clear  NECK: Supple, No JVD  NERVOUS SYSTEM:  No focal deficits  CHEST/LUNG: Clear to percussion bilaterally; No rales, rhonchi, wheezing  HEART: Regular rate and rhythm; No murmurs, rubs, or gallops  ABDOMEN: Soft, moderately tender in RUQ  EXTREMITIES:  2+ Peripheral Pulses, No clubbing, cyanosis, or edema  SKIN: No rashes or lesions    LABS:                        12.0   10.03 )-----------( 284      ( 06 Mar 2022 07:11 )             36.8     03-06    138  |  107  |  7   ----------------------------<  191<H>  3.8   |  27  |  0.58    Ca    8.4<L>      06 Mar 2022 07:11  Mg     2.0     03-06    TPro  6.1  /  Alb  2.7<L>  /  TBili  0.8  /  DBili  x   /  AST  18  /  ALT  21  /  AlkPhos  63  03-06    PT/INR - ( 06 Mar 2022 07:11 )   PT: 12.6 sec;   INR: 1.06 ratio         PTT - ( 06 Mar 2022 07:11 )  PTT:31.5 sec    CAPILLARY BLOOD GLUCOSE      POCT Blood Glucose.: 229 mg/dL (06 Mar 2022 11:11)  POCT Blood Glucose.: 188 mg/dL (06 Mar 2022 08:02)  POCT Blood Glucose.: 171 mg/dL (05 Mar 2022 21:10)  POCT Blood Glucose.: 212 mg/dL (05 Mar 2022 16:10)      RADIOLOGY & ADDITIONAL TESTS:    Imaging Personally Reviewed:  [ ] YES  [ ] NO    Consultant(s) Notes Reviewed:  [ ] YES  [ ] NO    Care Discussed with Consultants/Other Providers [ ] YES  [ ] NO

## 2022-03-06 NOTE — PROGRESS NOTE ADULT - SUBJECTIVE AND OBJECTIVE BOX
Team Surgery Preop Note    Patient is a 51y old  Female who presents with a chief complaint of Abdominal pain and nausea. (05 Mar 2022 17:11)    Diagnosis: acute cholecystitis  Procedure: lap cholecystectomy  Surgeon: Dr. Milan                          12.0   10.03 )-----------( 284      ( 06 Mar 2022 07:11 )             36.8     03-06    138  |  107  |  7   ----------------------------<  191<H>  3.8   |  27  |  0.58    Ca    8.4<L>      06 Mar 2022 07:11  Mg     2.0     03-06    TPro  6.1  /  Alb  2.7<L>  /  TBili  0.8  /  DBili  x   /  AST  18  /  ALT  21  /  AlkPhos  63  03-06    PT/INR - ( 06 Mar 2022 07:11 )   PT: 12.6 sec;   INR: 1.06 ratio       PTT - ( 06 Mar 2022 07:11 )  PTT:31.5 sec  ABO RH Interpretation: O POS (03.06.22 @ 07:11)   A1C with Estimated Average Glucose Result: 10.1:   < from: Xray Chest 1 View- PORTABLE-Urgent (Xray Chest 1 View- PORTABLE-Urgent .) (03.05.22 @ 17:45) >  IMPRESSION:  Mild bibasilar platelike atelectasis    < from: US Abdomen Complete (US Abdomen Complete .) (03.04.22 @ 18:29) >  IMPRESSION:  Fatty infiltration of the liver.    Gallstones. Sonographic Gallego's sign could not be accurately assessed   because the patient received pain medication.    < from: 12 Lead ECG (03.04.22 @ 13:57) >    Diagnosis Line Normal sinus rhythm  Normal ECG    < end of copied text >      [x ] Type & Screen  [x ] CBC  [x ] BMP  x[ ] PT/PTT/INR  [x ] Urinalysis  [x ] Chest X-ray  [x ] EKG  [x ] NPO/IVF  [ ] Consent to be obtained by surgeon  [ x] Added on to OR Schedule- spoke to nursing administration for add on.         51F presents with abdominal pain and vomiting. Dx with acute cholecystitis    HPI:    Vital Signs Last 24 Hrs  T(C): 36.6 (06 Mar 2022 05:55), Max: 37.2 (05 Mar 2022 11:32)  T(F): 97.9 (06 Mar 2022 05:55), Max: 99 (05 Mar 2022 11:32)  HR: 73 (06 Mar 2022 05:55) (69 - 77)  BP: 100/63 (06 Mar 2022 05:55) (100/63 - 110/60)  BP(mean): --  RR: 17 (06 Mar 2022 05:55) (17 - 17)  SpO2: 95% (06 Mar 2022 05:55) (95% - 96%)    MEDICATIONS  (STANDING):  dextrose 40% Gel 15 Gram(s) Oral once  dextrose 5%. 1000 milliLiter(s) (50 mL/Hr) IV Continuous <Continuous>  dextrose 5%. 1000 milliLiter(s) (100 mL/Hr) IV Continuous <Continuous>  dextrose 50% Injectable 25 Gram(s) IV Push once  dextrose 50% Injectable 12.5 Gram(s) IV Push once  dextrose 50% Injectable 25 Gram(s) IV Push once  glucagon  Injectable 1 milliGRAM(s) IntraMuscular once  heparin   Injectable 7500 Unit(s) SubCutaneous every 12 hours  insulin glargine Injectable (LANTUS) 6 Unit(s) SubCutaneous at bedtime  insulin lispro (ADMELOG) corrective regimen sliding scale   SubCutaneous three times a day before meals  lactated ringers. 1000 milliLiter(s) (100 mL/Hr) IV Continuous <Continuous>  nicotine -  14 mG/24Hr(s) Patch 1 patch Transdermal daily  pantoprazole  Injectable 40 milliGRAM(s) IV Push daily  piperacillin/tazobactam IVPB.. 3.375 Gram(s) IV Intermittent every 8 hours    MEDICATIONS  (PRN):  acetaminophen     Tablet .. 650 milliGRAM(s) Oral every 6 hours PRN Temp greater or equal to 38C (100.4F), Mild Pain (1 - 3)  ondansetron Injectable 4 milliGRAM(s) IV Push every 6 hours PRN Nausea and/or Vomiting      PHYSICAL EXAM:      Constitutional: awake and alert.  HEENT: PERRLA, EOMI,   Neck: Supple.  Respiratory: Breath sounds are clear bilaterally  Cardiovascular: S1 and S2, regular  Gastrointestinal: soft, + epigastric tenderness + BSx 4  Extremities:  no edema  Vascular: Caritid Bruit - no  Musculoskeletal: no joint swelling/tenderness, no abnormal movements  Skin: No rashes    A/P 51F with acute cholecytitis and DM    plan for OR tomorrow on call  A1c 10.1- on ISS  tolerating clears- npo p midnight   see below  on zosyn  seen on rounds with Dr. Milan    Team Surgery Preop Note    Patient is a 51y old  Female who presents with a chief complaint of Abdominal pain and nausea. (05 Mar 2022 17:11)    Diagnosis: acute cholecystitis  Procedure: lap cholecystectomy  Surgeon: Dr. Milan                          12.0   10.03 )-----------( 284      ( 06 Mar 2022 07:11 )             36.8     03-06    138  |  107  |  7   ----------------------------<  191<H>  3.8   |  27  |  0.58    Ca    8.4<L>      06 Mar 2022 07:11  Mg     2.0     03-06    TPro  6.1  /  Alb  2.7<L>  /  TBili  0.8  /  DBili  x   /  AST  18  /  ALT  21  /  AlkPhos  63  03-06    PT/INR - ( 06 Mar 2022 07:11 )   PT: 12.6 sec;   INR: 1.06 ratio       PTT - ( 06 Mar 2022 07:11 )  PTT:31.5 sec  ABO RH Interpretation: O POS (03.06.22 @ 07:11)   A1C with Estimated Average Glucose Result: 10.1:   < from: Xray Chest 1 View- PORTABLE-Urgent (Xray Chest 1 View- PORTABLE-Urgent .) (03.05.22 @ 17:45) >  IMPRESSION:  Mild bibasilar platelike atelectasis    < from: US Abdomen Complete (US Abdomen Complete .) (03.04.22 @ 18:29) >  IMPRESSION:  Fatty infiltration of the liver.    Gallstones. Sonographic Gallego's sign could not be accurately assessed   because the patient received pain medication.    < from: 12 Lead ECG (03.04.22 @ 13:57) >    Diagnosis Line Normal sinus rhythm  Normal ECG    < end of copied text >      [x ] Type & Screen  [x ] CBC  [x ] BMP  x[ ] PT/PTT/INR  [x ] Urinalysis  [x ] Chest X-ray  [x ] EKG  [x ] NPO/IVF  [ ] Consent to be obtained by surgeon  [ x] Added on to OR Schedule- spoke to nursing administration for add on.

## 2022-03-07 ENCOUNTER — RESULT REVIEW (OUTPATIENT)
Age: 52
End: 2022-03-07

## 2022-03-07 LAB
ALBUMIN SERPL ELPH-MCNC: 3 G/DL — LOW (ref 3.3–5)
ALP SERPL-CCNC: 64 U/L — SIGNIFICANT CHANGE UP (ref 40–120)
ALT FLD-CCNC: 22 U/L — SIGNIFICANT CHANGE UP (ref 12–78)
ANION GAP SERPL CALC-SCNC: 6 MMOL/L — SIGNIFICANT CHANGE UP (ref 5–17)
AST SERPL-CCNC: 19 U/L — SIGNIFICANT CHANGE UP (ref 15–37)
BILIRUB DIRECT SERPL-MCNC: 0.1 MG/DL — SIGNIFICANT CHANGE UP (ref 0–0.3)
BILIRUB INDIRECT FLD-MCNC: 0.7 MG/DL — SIGNIFICANT CHANGE UP (ref 0.2–1)
BILIRUB SERPL-MCNC: 0.8 MG/DL — SIGNIFICANT CHANGE UP (ref 0.2–1.2)
BUN SERPL-MCNC: 5 MG/DL — LOW (ref 7–23)
CALCIUM SERPL-MCNC: 8.7 MG/DL — SIGNIFICANT CHANGE UP (ref 8.5–10.1)
CHLORIDE SERPL-SCNC: 106 MMOL/L — SIGNIFICANT CHANGE UP (ref 96–108)
CO2 SERPL-SCNC: 26 MMOL/L — SIGNIFICANT CHANGE UP (ref 22–31)
CREAT SERPL-MCNC: 0.51 MG/DL — SIGNIFICANT CHANGE UP (ref 0.5–1.3)
EGFR: 114 ML/MIN/1.73M2 — SIGNIFICANT CHANGE UP
FLUAV AG NPH QL: SIGNIFICANT CHANGE UP
FLUBV AG NPH QL: SIGNIFICANT CHANGE UP
GLUCOSE BLDC GLUCOMTR-MCNC: 142 MG/DL — HIGH (ref 70–99)
GLUCOSE BLDC GLUCOMTR-MCNC: 170 MG/DL — HIGH (ref 70–99)
GLUCOSE BLDC GLUCOMTR-MCNC: 224 MG/DL — HIGH (ref 70–99)
GLUCOSE BLDC GLUCOMTR-MCNC: 267 MG/DL — HIGH (ref 70–99)
GLUCOSE SERPL-MCNC: 174 MG/DL — HIGH (ref 70–99)
HCG SERPL-ACNC: <1 MIU/ML — SIGNIFICANT CHANGE UP
HCT VFR BLD CALC: 38.3 % — SIGNIFICANT CHANGE UP (ref 34.5–45)
HGB BLD-MCNC: 12.3 G/DL — SIGNIFICANT CHANGE UP (ref 11.5–15.5)
MAGNESIUM SERPL-MCNC: 2.3 MG/DL — SIGNIFICANT CHANGE UP (ref 1.6–2.6)
MCHC RBC-ENTMCNC: 27.4 PG — SIGNIFICANT CHANGE UP (ref 27–34)
MCHC RBC-ENTMCNC: 32.1 G/DL — SIGNIFICANT CHANGE UP (ref 32–36)
MCV RBC AUTO: 85.3 FL — SIGNIFICANT CHANGE UP (ref 80–100)
NRBC # BLD: 0 /100 WBCS — SIGNIFICANT CHANGE UP (ref 0–0)
PHOSPHATE SERPL-MCNC: 2.8 MG/DL — SIGNIFICANT CHANGE UP (ref 2.5–4.5)
PLATELET # BLD AUTO: 310 K/UL — SIGNIFICANT CHANGE UP (ref 150–400)
POTASSIUM SERPL-MCNC: 3.7 MMOL/L — SIGNIFICANT CHANGE UP (ref 3.5–5.3)
POTASSIUM SERPL-SCNC: 3.7 MMOL/L — SIGNIFICANT CHANGE UP (ref 3.5–5.3)
PROT SERPL-MCNC: 6.7 GM/DL — SIGNIFICANT CHANGE UP (ref 6–8.3)
RBC # BLD: 4.49 M/UL — SIGNIFICANT CHANGE UP (ref 3.8–5.2)
RBC # FLD: 12.9 % — SIGNIFICANT CHANGE UP (ref 10.3–14.5)
SARS-COV-2 RNA SPEC QL NAA+PROBE: SIGNIFICANT CHANGE UP
SODIUM SERPL-SCNC: 138 MMOL/L — SIGNIFICANT CHANGE UP (ref 135–145)
WBC # BLD: 7.65 K/UL — SIGNIFICANT CHANGE UP (ref 3.8–10.5)
WBC # FLD AUTO: 7.65 K/UL — SIGNIFICANT CHANGE UP (ref 3.8–10.5)

## 2022-03-07 PROCEDURE — 99221 1ST HOSP IP/OBS SF/LOW 40: CPT | Mod: 57

## 2022-03-07 PROCEDURE — 99233 SBSQ HOSP IP/OBS HIGH 50: CPT

## 2022-03-07 PROCEDURE — 47562 LAPAROSCOPIC CHOLECYSTECTOMY: CPT

## 2022-03-07 PROCEDURE — 88304 TISSUE EXAM BY PATHOLOGIST: CPT | Mod: 26

## 2022-03-07 RX ORDER — SODIUM CHLORIDE 9 MG/ML
1000 INJECTION, SOLUTION INTRAVENOUS
Refills: 0 | Status: DISCONTINUED | OUTPATIENT
Start: 2022-03-07 | End: 2022-03-07

## 2022-03-07 RX ORDER — ACETAMINOPHEN 500 MG
650 TABLET ORAL EVERY 6 HOURS
Refills: 0 | Status: DISCONTINUED | OUTPATIENT
Start: 2022-03-07 | End: 2022-03-08

## 2022-03-07 RX ORDER — INSULIN LISPRO 100/ML
VIAL (ML) SUBCUTANEOUS
Refills: 0 | Status: DISCONTINUED | OUTPATIENT
Start: 2022-03-07 | End: 2022-03-08

## 2022-03-07 RX ORDER — NICOTINE POLACRILEX 2 MG
1 GUM BUCCAL DAILY
Refills: 0 | Status: DISCONTINUED | OUTPATIENT
Start: 2022-03-07 | End: 2022-03-08

## 2022-03-07 RX ORDER — HEPARIN SODIUM 5000 [USP'U]/ML
7500 INJECTION INTRAVENOUS; SUBCUTANEOUS EVERY 12 HOURS
Refills: 0 | Status: DISCONTINUED | OUTPATIENT
Start: 2022-03-07 | End: 2022-03-07

## 2022-03-07 RX ORDER — DEXTROSE 50 % IN WATER 50 %
12.5 SYRINGE (ML) INTRAVENOUS ONCE
Refills: 0 | Status: DISCONTINUED | OUTPATIENT
Start: 2022-03-07 | End: 2022-03-11

## 2022-03-07 RX ORDER — HEPARIN SODIUM 5000 [USP'U]/ML
7500 INJECTION INTRAVENOUS; SUBCUTANEOUS EVERY 12 HOURS
Refills: 0 | Status: DISCONTINUED | OUTPATIENT
Start: 2022-03-07 | End: 2022-03-11

## 2022-03-07 RX ORDER — INSULIN GLARGINE 100 [IU]/ML
6 INJECTION, SOLUTION SUBCUTANEOUS AT BEDTIME
Refills: 0 | Status: DISCONTINUED | OUTPATIENT
Start: 2022-03-07 | End: 2022-03-11

## 2022-03-07 RX ORDER — GLUCAGON INJECTION, SOLUTION 0.5 MG/.1ML
1 INJECTION, SOLUTION SUBCUTANEOUS ONCE
Refills: 0 | Status: DISCONTINUED | OUTPATIENT
Start: 2022-03-07 | End: 2022-03-11

## 2022-03-07 RX ORDER — DEXTROSE 50 % IN WATER 50 %
15 SYRINGE (ML) INTRAVENOUS ONCE
Refills: 0 | Status: DISCONTINUED | OUTPATIENT
Start: 2022-03-07 | End: 2022-03-11

## 2022-03-07 RX ORDER — ONDANSETRON 8 MG/1
4 TABLET, FILM COATED ORAL EVERY 6 HOURS
Refills: 0 | Status: DISCONTINUED | OUTPATIENT
Start: 2022-03-07 | End: 2022-03-11

## 2022-03-07 RX ORDER — SODIUM CHLORIDE 9 MG/ML
1000 INJECTION, SOLUTION INTRAVENOUS
Refills: 0 | Status: DISCONTINUED | OUTPATIENT
Start: 2022-03-07 | End: 2022-03-11

## 2022-03-07 RX ORDER — DEXTROSE 50 % IN WATER 50 %
25 SYRINGE (ML) INTRAVENOUS ONCE
Refills: 0 | Status: DISCONTINUED | OUTPATIENT
Start: 2022-03-07 | End: 2022-03-11

## 2022-03-07 RX ORDER — PANTOPRAZOLE SODIUM 20 MG/1
40 TABLET, DELAYED RELEASE ORAL DAILY
Refills: 0 | Status: DISCONTINUED | OUTPATIENT
Start: 2022-03-07 | End: 2022-03-11

## 2022-03-07 RX ORDER — NICOTINE POLACRILEX 2 MG
1 GUM BUCCAL DAILY
Refills: 0 | Status: DISCONTINUED | OUTPATIENT
Start: 2022-03-07 | End: 2022-03-11

## 2022-03-07 RX ORDER — INFLUENZA VIRUS VACCINE 15; 15; 15; 15 UG/.5ML; UG/.5ML; UG/.5ML; UG/.5ML
0.5 SUSPENSION INTRAMUSCULAR ONCE
Refills: 0 | Status: DISCONTINUED | OUTPATIENT
Start: 2022-03-07 | End: 2022-03-11

## 2022-03-07 RX ORDER — HYDROMORPHONE HYDROCHLORIDE 2 MG/ML
0.5 INJECTION INTRAMUSCULAR; INTRAVENOUS; SUBCUTANEOUS
Refills: 0 | Status: DISCONTINUED | OUTPATIENT
Start: 2022-03-07 | End: 2022-03-07

## 2022-03-07 RX ORDER — INSULIN LISPRO 100/ML
VIAL (ML) SUBCUTANEOUS
Refills: 0 | Status: DISCONTINUED | OUTPATIENT
Start: 2022-03-07 | End: 2022-03-11

## 2022-03-07 RX ORDER — PANTOPRAZOLE SODIUM 20 MG/1
40 TABLET, DELAYED RELEASE ORAL DAILY
Refills: 0 | Status: DISCONTINUED | OUTPATIENT
Start: 2022-03-07 | End: 2022-03-07

## 2022-03-07 RX ORDER — PIPERACILLIN AND TAZOBACTAM 4; .5 G/20ML; G/20ML
3.38 INJECTION, POWDER, LYOPHILIZED, FOR SOLUTION INTRAVENOUS EVERY 8 HOURS
Refills: 0 | Status: DISCONTINUED | OUTPATIENT
Start: 2022-03-07 | End: 2022-03-10

## 2022-03-07 RX ADMIN — SODIUM CHLORIDE 75 MILLILITER(S): 9 INJECTION, SOLUTION INTRAVENOUS at 16:45

## 2022-03-07 RX ADMIN — Medication 1 PATCH: at 20:29

## 2022-03-07 RX ADMIN — INSULIN GLARGINE 6 UNIT(S): 100 INJECTION, SOLUTION SUBCUTANEOUS at 22:05

## 2022-03-07 RX ADMIN — HYDROMORPHONE HYDROCHLORIDE 0.5 MILLIGRAM(S): 2 INJECTION INTRAMUSCULAR; INTRAVENOUS; SUBCUTANEOUS at 17:00

## 2022-03-07 RX ADMIN — Medication 650 MILLIGRAM(S): at 13:18

## 2022-03-07 RX ADMIN — Medication 1: at 07:50

## 2022-03-07 RX ADMIN — PIPERACILLIN AND TAZOBACTAM 25 GRAM(S): 4; .5 INJECTION, POWDER, LYOPHILIZED, FOR SOLUTION INTRAVENOUS at 17:17

## 2022-03-07 RX ADMIN — ONDANSETRON 4 MILLIGRAM(S): 8 TABLET, FILM COATED ORAL at 17:53

## 2022-03-07 RX ADMIN — HYDROMORPHONE HYDROCHLORIDE 0.5 MILLIGRAM(S): 2 INJECTION INTRAMUSCULAR; INTRAVENOUS; SUBCUTANEOUS at 16:45

## 2022-03-07 RX ADMIN — Medication 1 PATCH: at 13:06

## 2022-03-07 RX ADMIN — Medication 650 MILLIGRAM(S): at 07:55

## 2022-03-07 RX ADMIN — Medication 1 PATCH: at 07:29

## 2022-03-07 RX ADMIN — PANTOPRAZOLE SODIUM 40 MILLIGRAM(S): 20 TABLET, DELAYED RELEASE ORAL at 13:03

## 2022-03-07 RX ADMIN — PIPERACILLIN AND TAZOBACTAM 25 GRAM(S): 4; .5 INJECTION, POWDER, LYOPHILIZED, FOR SOLUTION INTRAVENOUS at 07:45

## 2022-03-07 RX ADMIN — SODIUM CHLORIDE 100 MILLILITER(S): 9 INJECTION, SOLUTION INTRAVENOUS at 07:51

## 2022-03-07 RX ADMIN — Medication 1 PATCH: at 13:03

## 2022-03-07 RX ADMIN — HEPARIN SODIUM 7500 UNIT(S): 5000 INJECTION INTRAVENOUS; SUBCUTANEOUS at 06:40

## 2022-03-07 NOTE — PROGRESS NOTE ADULT - ASSESSMENT
50 yo female PMH of "gastritis" presents complanining of sudden onset sharp epigastric pain associated with n/v, poor po intolerance since this AM. Reports she woke up and felt fine but then after breakfast abdominal pain began. Pain was unbearable prompting ED visit. Denies chest pain, bloody emesis, changes in BM, fevers/chills, sob, dizziness, loc and other assocaited symptoms. LBM this morning, normal, no blood or melena.     Abdominal pain -- chronic, but with marked recent increase    CT of abdomen read as normal, but US shows gallstones    Seen by surgery (Kayli) who feels this is consistent w/ acute on chronic cholecystitis    Lap Tana planned for 3/7    Zosyn added. F/u blood cx.     Diabetes type 2 -- new diagnosis    A1c = 10.1    Lantus 6 units + SSI    Can hold while npo    At discharge, can probably transition to oral med such as metformin    Disp: Pending clinical improvement.

## 2022-03-07 NOTE — PROGRESS NOTE ADULT - SUBJECTIVE AND OBJECTIVE BOX
Patient seen and examined resting, now POD#0 s/p laparoscopic cholecystectomy.  Sleepy after surgery, but states she has no pain.  Emesis x1 post operatively per  at bedside and RN; resolved.  HD stable.     Vital Signs Last 24 Hrs  T(F): 98 (03-07-22 @ 17:00), Max: 98.6 (03-06-22 @ 23:14)  HR: 75 (03-07-22 @ 17:25)  BP: 136/83 (03-07-22 @ 17:25)  RR: 16 (03-07-22 @ 17:25)  SpO2: 94% (03-07-22 @ 17:25)  POCT Blood Glucose.: 224 mg/dL (07 Mar 2022 16:41)    PHYSICAL EXAM:  GENERAL: lethargic, NAD  CHEST/LUNG: Clear to auscultation bilaterally, respirations nonlabored  HEART: Regular rate and rhythm; S1 & S2 appreciated  ABDOMEN: soft, non tender, non distended. Pink foam dressings c/d/i  EXTREMITIES:  no calf tenderness, No edema    I&O's Detail    06 Mar 2022 07:01  -  07 Mar 2022 07:00  --------------------------------------------------------  IN:    IV PiggyBack: 200 mL    Lactated Ringers: 2100 mL  Total IN: 2300 mL    OUT:  Total OUT: 0 mL    Total NET: 2300 mL      07 Mar 2022 07:01  -  07 Mar 2022 18:40  --------------------------------------------------------  IN:    Lactated Ringers: 75 mL  Total IN: 75 mL    OUT:  Total OUT: 0 mL    Total NET: 75 mL    LABS:                        12.3   7.65  )-----------( 310      ( 07 Mar 2022 07:25 )             38.3     03-07    138  |  106  |  5<L>  ----------------------------<  174<H>  3.7   |  26  |  0.51    Ca    8.7      07 Mar 2022 07:25  Phos  2.8     03-07  Mg     2.3     03-07    TPro  6.7  /  Alb  3.0<L>  /  TBili  0.8  /  DBili  0.1  /  AST  19  /  ALT  22  /  AlkPhos  64  03-07    PT/INR - ( 06 Mar 2022 07:11 )   PT: 12.6 sec;   INR: 1.06 ratio    PTT - ( 06 Mar 2022 07:11 )  PTT:31.5 sec    A/P  51F with PMH of uncontrolled DM, with acute cholecystitis, POD#0 s/p laparoscopic cholecystectomy    - consistent carb diet  - d/c IVF in AM  - analgesia prn  - abx  - DVT ppx, OOB/AAT, IS  - d/w Dr. Milan

## 2022-03-07 NOTE — PROGRESS NOTE ADULT - SUBJECTIVE AND OBJECTIVE BOX
Patient is a 51y old  Female who presents with a chief complaint of Abdominal pain and nausea. (07 Mar 2022 09:36)    INTERVAL HPI/OVERNIGHT EVENTS: No acute events overnight. HD stable. Denies n/v/f/c/h, and abdominal pain    MEDICATIONS  (STANDING):  influenza   Vaccine 0.5 milliLiter(s) IntraMuscular once    MEDICATIONS  (PRN):      Allergies    No Known Allergies    Intolerances        REVIEW OF SYSTEMS: all negative with exception of above    Vital Signs Last 24 Hrs  T(C): 36.9 (07 Mar 2022 11:57), Max: 37.1 (06 Mar 2022 17:09)  T(F): 98.5 (07 Mar 2022 11:57), Max: 98.7 (06 Mar 2022 17:09)  HR: 66 (07 Mar 2022 11:57) (66 - 72)  BP: 106/70 (07 Mar 2022 11:57) (103/71 - 116/78)  BP(mean): --  RR: 18 (07 Mar 2022 11:57) (17 - 18)  SpO2: 95% (07 Mar 2022 11:57) (95% - 97%)    PHYSICAL EXAM:  GENERAL: NAD, well-groomed, well-developed  HEAD:  Atraumatic, Normocephalic  EYES: EOMI, PERRLA, conjunctiva and sclera clear  ENMT: No tonsillar erythema, exudates, or enlargement; Moist mucous membranes, Good dentition, No lesions  NECK: Supple, No JVD, Normal thyroid  NERVOUS SYSTEM:  Alert & Oriented X3, Good concentration; Motor Strength 5/5 B/L upper and lower extremities; DTRs 2+ intact and symmetric  CHEST/LUNG: Clear to percussion bilaterally; No rales, rhonchi, wheezing, or rubs  HEART: Regular rate and rhythm; No murmurs, rubs, or gallops  ABDOMEN: Soft, Nontender, Nondistended; Bowel sounds present  EXTREMITIES:  2+ Peripheral Pulses, No clubbing, cyanosis, or edema  LYMPH: No lymphadenopathy noted  SKIN: No rashes or lesions    LABS:                        12.3   7.65  )-----------( 310      ( 07 Mar 2022 07:25 )             38.3     03-07    138  |  106  |  5<L>  ----------------------------<  174<H>  3.7   |  26  |  0.51    Ca    8.7      07 Mar 2022 07:25  Phos  2.8     03-07  Mg     2.3     03-07    TPro  6.7  /  Alb  3.0<L>  /  TBili  0.8  /  DBili  0.1  /  AST  19  /  ALT  22  /  AlkPhos  64  03-07    PT/INR - ( 06 Mar 2022 07:11 )   PT: 12.6 sec;   INR: 1.06 ratio         PTT - ( 06 Mar 2022 07:11 )  PTT:31.5 sec    CAPILLARY BLOOD GLUCOSE      POCT Blood Glucose.: 142 mg/dL (07 Mar 2022 11:43)  POCT Blood Glucose.: 170 mg/dL (07 Mar 2022 07:41)  POCT Blood Glucose.: 176 mg/dL (06 Mar 2022 21:13)  POCT Blood Glucose.: 196 mg/dL (06 Mar 2022 16:08)      RADIOLOGY & ADDITIONAL TESTS:    Imaging Personally Reviewed:  [ ] YES  [ ] NO    Consultant(s) Notes Reviewed:  [ ] YES  [ ] NO    Care Discussed with Consultants/Other Providers [ ] YES  [ ] NO

## 2022-03-07 NOTE — BRIEF OPERATIVE NOTE - OPERATION/FINDINGS
Edematous gallbladder. Critical view obtained. Duct clipped and cut and endolooped. Artery clipped and cut. Gallbladder taken off the liver bed with electrocautery. Hemostasis achieved.

## 2022-03-07 NOTE — PROGRESS NOTE ADULT - SUBJECTIVE AND OBJECTIVE BOX
Pre-operative Note    - Pre-operative Diagnosis: Acute cholecystitis     - Procedure: Lap pietro, poss open     - Labs:                        12.3   7.65  )-----------( 310      ( 07 Mar 2022 07:25 )             38.3     03-07    138  |  106  |  5<L>  ----------------------------<  174<H>  3.7   |  26  |  0.51    Ca    8.7      07 Mar 2022 07:25  Phos  2.8     03-07  Mg     2.3     03-07    TPro  6.1  /  Alb  2.7<L>  /  TBili  0.8  /  DBili  x   /  AST  18  /  ALT  21  /  AlkPhos  63  03-06    PT/INR - ( 06 Mar 2022 07:11 )   PT: 12.6 sec;   INR: 1.06 ratio       PTT - ( 06 Mar 2022 07:11 )  PTT:31.5 sec    Type & Screen: O POS    - CXR 3/5: mild bibasilar atelectasis     - Blood: Not needed.     - Orders:  > NPO at midnight  > Perioperative antibiotics  > F/u COVID swab     - Permits:  > Consent in chart.  > Case scheduled with OR.  > Medical clearance in chart.

## 2022-03-07 NOTE — PROGRESS NOTE ADULT - ASSESSMENT
Patient is a 52 y/o woman with acute cholecystitis  Abd soft, minimal RUQ tenderness  Risks, benefits, alternatives to laparoscopic cholecystectomy explained and consent obtained  NPO ,IVF  IV abx  OR now

## 2022-03-08 ENCOUNTER — TRANSCRIPTION ENCOUNTER (OUTPATIENT)
Age: 52
End: 2022-03-08

## 2022-03-08 LAB
ALBUMIN SERPL ELPH-MCNC: 3.5 G/DL — SIGNIFICANT CHANGE UP (ref 3.3–5)
ALP SERPL-CCNC: 96 U/L — SIGNIFICANT CHANGE UP (ref 40–120)
ALT FLD-CCNC: 46 U/L — SIGNIFICANT CHANGE UP (ref 12–78)
ANION GAP SERPL CALC-SCNC: 7 MMOL/L — SIGNIFICANT CHANGE UP (ref 5–17)
ANION GAP SERPL CALC-SCNC: 7 MMOL/L — SIGNIFICANT CHANGE UP (ref 5–17)
AST SERPL-CCNC: 41 U/L — HIGH (ref 15–37)
BILIRUB SERPL-MCNC: 0.7 MG/DL — SIGNIFICANT CHANGE UP (ref 0.2–1.2)
BUN SERPL-MCNC: 9 MG/DL — SIGNIFICANT CHANGE UP (ref 7–23)
BUN SERPL-MCNC: 9 MG/DL — SIGNIFICANT CHANGE UP (ref 7–23)
CALCIUM SERPL-MCNC: 8.7 MG/DL — SIGNIFICANT CHANGE UP (ref 8.5–10.1)
CALCIUM SERPL-MCNC: 9 MG/DL — SIGNIFICANT CHANGE UP (ref 8.5–10.1)
CHLORIDE SERPL-SCNC: 103 MMOL/L — SIGNIFICANT CHANGE UP (ref 96–108)
CHLORIDE SERPL-SCNC: 104 MMOL/L — SIGNIFICANT CHANGE UP (ref 96–108)
CO2 SERPL-SCNC: 26 MMOL/L — SIGNIFICANT CHANGE UP (ref 22–31)
CO2 SERPL-SCNC: 27 MMOL/L — SIGNIFICANT CHANGE UP (ref 22–31)
CREAT SERPL-MCNC: 0.87 MG/DL — SIGNIFICANT CHANGE UP (ref 0.5–1.3)
CREAT SERPL-MCNC: 1.06 MG/DL — SIGNIFICANT CHANGE UP (ref 0.5–1.3)
EGFR: 64 ML/MIN/1.73M2 — SIGNIFICANT CHANGE UP
EGFR: 82 ML/MIN/1.73M2 — SIGNIFICANT CHANGE UP
GLUCOSE BLDC GLUCOMTR-MCNC: 174 MG/DL — HIGH (ref 70–99)
GLUCOSE BLDC GLUCOMTR-MCNC: 207 MG/DL — HIGH (ref 70–99)
GLUCOSE BLDC GLUCOMTR-MCNC: 227 MG/DL — HIGH (ref 70–99)
GLUCOSE BLDC GLUCOMTR-MCNC: 281 MG/DL — HIGH (ref 70–99)
GLUCOSE SERPL-MCNC: 227 MG/DL — HIGH (ref 70–99)
GLUCOSE SERPL-MCNC: 277 MG/DL — HIGH (ref 70–99)
HCT VFR BLD CALC: 39 % — SIGNIFICANT CHANGE UP (ref 34.5–45)
HCT VFR BLD CALC: 39.3 % — SIGNIFICANT CHANGE UP (ref 34.5–45)
HGB BLD-MCNC: 13 G/DL — SIGNIFICANT CHANGE UP (ref 11.5–15.5)
HGB BLD-MCNC: 13.2 G/DL — SIGNIFICANT CHANGE UP (ref 11.5–15.5)
MCHC RBC-ENTMCNC: 27.7 PG — SIGNIFICANT CHANGE UP (ref 27–34)
MCHC RBC-ENTMCNC: 28 PG — SIGNIFICANT CHANGE UP (ref 27–34)
MCHC RBC-ENTMCNC: 33.3 G/DL — SIGNIFICANT CHANGE UP (ref 32–36)
MCHC RBC-ENTMCNC: 33.6 G/DL — SIGNIFICANT CHANGE UP (ref 32–36)
MCV RBC AUTO: 83.2 FL — SIGNIFICANT CHANGE UP (ref 80–100)
MCV RBC AUTO: 83.4 FL — SIGNIFICANT CHANGE UP (ref 80–100)
NRBC # BLD: 0 /100 WBCS — SIGNIFICANT CHANGE UP (ref 0–0)
NRBC # BLD: 0 /100 WBCS — SIGNIFICANT CHANGE UP (ref 0–0)
PLATELET # BLD AUTO: 349 K/UL — SIGNIFICANT CHANGE UP (ref 150–400)
PLATELET # BLD AUTO: 364 K/UL — SIGNIFICANT CHANGE UP (ref 150–400)
POTASSIUM SERPL-MCNC: 3.2 MMOL/L — LOW (ref 3.5–5.3)
POTASSIUM SERPL-MCNC: 3.5 MMOL/L — SIGNIFICANT CHANGE UP (ref 3.5–5.3)
POTASSIUM SERPL-SCNC: 3.2 MMOL/L — LOW (ref 3.5–5.3)
POTASSIUM SERPL-SCNC: 3.5 MMOL/L — SIGNIFICANT CHANGE UP (ref 3.5–5.3)
PROT SERPL-MCNC: 7.9 GM/DL — SIGNIFICANT CHANGE UP (ref 6–8.3)
RBC # BLD: 4.69 M/UL — SIGNIFICANT CHANGE UP (ref 3.8–5.2)
RBC # BLD: 4.71 M/UL — SIGNIFICANT CHANGE UP (ref 3.8–5.2)
RBC # FLD: 12.6 % — SIGNIFICANT CHANGE UP (ref 10.3–14.5)
RBC # FLD: 12.8 % — SIGNIFICANT CHANGE UP (ref 10.3–14.5)
SODIUM SERPL-SCNC: 136 MMOL/L — SIGNIFICANT CHANGE UP (ref 135–145)
SODIUM SERPL-SCNC: 138 MMOL/L — SIGNIFICANT CHANGE UP (ref 135–145)
TROPONIN I, HIGH SENSITIVITY RESULT: <3 NG/L — SIGNIFICANT CHANGE UP
WBC # BLD: 10.33 K/UL — SIGNIFICANT CHANGE UP (ref 3.8–10.5)
WBC # BLD: 12.18 K/UL — HIGH (ref 3.8–10.5)
WBC # FLD AUTO: 10.33 K/UL — SIGNIFICANT CHANGE UP (ref 3.8–10.5)
WBC # FLD AUTO: 12.18 K/UL — HIGH (ref 3.8–10.5)

## 2022-03-08 PROCEDURE — 74019 RADEX ABDOMEN 2 VIEWS: CPT | Mod: 26

## 2022-03-08 PROCEDURE — 99233 SBSQ HOSP IP/OBS HIGH 50: CPT

## 2022-03-08 PROCEDURE — 71045 X-RAY EXAM CHEST 1 VIEW: CPT | Mod: 26

## 2022-03-08 PROCEDURE — 93010 ELECTROCARDIOGRAM REPORT: CPT

## 2022-03-08 RX ORDER — NICOTINE POLACRILEX 2 MG
0 GUM BUCCAL
Qty: 0 | Refills: 0 | DISCHARGE
Start: 2022-03-08

## 2022-03-08 RX ORDER — OXYCODONE HYDROCHLORIDE 5 MG/1
5 TABLET ORAL EVERY 6 HOURS
Refills: 0 | Status: DISCONTINUED | OUTPATIENT
Start: 2022-03-08 | End: 2022-03-11

## 2022-03-08 RX ORDER — ACETAMINOPHEN 500 MG
2 TABLET ORAL
Qty: 0 | Refills: 0 | DISCHARGE
Start: 2022-03-08

## 2022-03-08 RX ORDER — OXYCODONE HYDROCHLORIDE 5 MG/1
1 TABLET ORAL
Qty: 12 | Refills: 0
Start: 2022-03-08 | End: 2022-03-10

## 2022-03-08 RX ORDER — MORPHINE SULFATE 50 MG/1
2 CAPSULE, EXTENDED RELEASE ORAL ONCE
Refills: 0 | Status: DISCONTINUED | OUTPATIENT
Start: 2022-03-08 | End: 2022-03-11

## 2022-03-08 RX ORDER — POTASSIUM CHLORIDE 20 MEQ
40 PACKET (EA) ORAL ONCE
Refills: 0 | Status: COMPLETED | OUTPATIENT
Start: 2022-03-08 | End: 2022-03-08

## 2022-03-08 RX ORDER — INSULIN GLARGINE 100 [IU]/ML
6 INJECTION, SOLUTION SUBCUTANEOUS
Qty: 0 | Refills: 0 | DISCHARGE
Start: 2022-03-08

## 2022-03-08 RX ORDER — MORPHINE SULFATE 50 MG/1
2 CAPSULE, EXTENDED RELEASE ORAL EVERY 6 HOURS
Refills: 0 | Status: DISCONTINUED | OUTPATIENT
Start: 2022-03-08 | End: 2022-03-11

## 2022-03-08 RX ORDER — SODIUM CHLORIDE 9 MG/ML
1000 INJECTION, SOLUTION INTRAVENOUS ONCE
Refills: 0 | Status: COMPLETED | OUTPATIENT
Start: 2022-03-08 | End: 2022-03-08

## 2022-03-08 RX ORDER — ACETAMINOPHEN 500 MG
650 TABLET ORAL EVERY 6 HOURS
Refills: 0 | Status: COMPLETED | OUTPATIENT
Start: 2022-03-08 | End: 2022-03-11

## 2022-03-08 RX ADMIN — Medication 650 MILLIGRAM(S): at 23:23

## 2022-03-08 RX ADMIN — INSULIN GLARGINE 6 UNIT(S): 100 INJECTION, SOLUTION SUBCUTANEOUS at 21:41

## 2022-03-08 RX ADMIN — PIPERACILLIN AND TAZOBACTAM 25 GRAM(S): 4; .5 INJECTION, POWDER, LYOPHILIZED, FOR SOLUTION INTRAVENOUS at 08:02

## 2022-03-08 RX ADMIN — OXYCODONE HYDROCHLORIDE 5 MILLIGRAM(S): 5 TABLET ORAL at 13:26

## 2022-03-08 RX ADMIN — Medication 650 MILLIGRAM(S): at 17:52

## 2022-03-08 RX ADMIN — Medication 1 PATCH: at 14:00

## 2022-03-08 RX ADMIN — HEPARIN SODIUM 7500 UNIT(S): 5000 INJECTION INTRAVENOUS; SUBCUTANEOUS at 06:11

## 2022-03-08 RX ADMIN — PANTOPRAZOLE SODIUM 40 MILLIGRAM(S): 20 TABLET, DELAYED RELEASE ORAL at 12:10

## 2022-03-08 RX ADMIN — PIPERACILLIN AND TAZOBACTAM 25 GRAM(S): 4; .5 INJECTION, POWDER, LYOPHILIZED, FOR SOLUTION INTRAVENOUS at 00:13

## 2022-03-08 RX ADMIN — Medication 40 MILLIEQUIVALENT(S): at 14:33

## 2022-03-08 RX ADMIN — Medication 1 PATCH: at 20:22

## 2022-03-08 RX ADMIN — Medication 1 PATCH: at 12:01

## 2022-03-08 RX ADMIN — Medication 1 PATCH: at 08:19

## 2022-03-08 RX ADMIN — PIPERACILLIN AND TAZOBACTAM 25 GRAM(S): 4; .5 INJECTION, POWDER, LYOPHILIZED, FOR SOLUTION INTRAVENOUS at 16:00

## 2022-03-08 RX ADMIN — PIPERACILLIN AND TAZOBACTAM 25 GRAM(S): 4; .5 INJECTION, POWDER, LYOPHILIZED, FOR SOLUTION INTRAVENOUS at 23:23

## 2022-03-08 RX ADMIN — HEPARIN SODIUM 7500 UNIT(S): 5000 INJECTION INTRAVENOUS; SUBCUTANEOUS at 17:49

## 2022-03-08 RX ADMIN — Medication 1: at 08:03

## 2022-03-08 RX ADMIN — OXYCODONE HYDROCHLORIDE 5 MILLIGRAM(S): 5 TABLET ORAL at 12:26

## 2022-03-08 RX ADMIN — Medication 650 MILLIGRAM(S): at 06:13

## 2022-03-08 RX ADMIN — SODIUM CHLORIDE 1000 MILLILITER(S): 9 INJECTION, SOLUTION INTRAVENOUS at 14:01

## 2022-03-08 RX ADMIN — Medication 2: at 16:31

## 2022-03-08 RX ADMIN — Medication 3: at 12:28

## 2022-03-08 RX ADMIN — ONDANSETRON 4 MILLIGRAM(S): 8 TABLET, FILM COATED ORAL at 16:31

## 2022-03-08 NOTE — DISCHARGE NOTE PROVIDER - PROVIDER TOKENS
FREE:[LAST:[Kayli],FIRST:[Brigido],PHONE:[phone]],FAX:[(   )    -],ADDRESS:[[address] [place] [address], [address] [place] [address]]] FREE:[LAST:[Kayli],FIRST:[Brigido],PHONE:[phone]],FAX:[(   )    -],ADDRESS:[[address] [place] [address], [address] [place] [address]]],PROVIDER:[TOKEN:[[id number]]]

## 2022-03-08 NOTE — DISCHARGE NOTE PROVIDER - NSDCFUADDAPPT_GEN_ALL_CORE_FT
Follow up with Dr. Milan in 1-2 weeks. Please call to schedule an appointment.   N   Follow up with Dr. Milan in 1-2 weeks. Please call to schedule an appointment.     Follow up with Dr. Arcos (GI) in 2 weeks. Please call to schedule an appointment.

## 2022-03-08 NOTE — PROGRESS NOTE ADULT - ASSESSMENT
50 yo female PMH of "gastritis" presents complanining of sudden onset sharp epigastric pain associated with n/v, poor po intolerance since this AM. Reports she woke up and felt fine but then after breakfast abdominal pain began. Pain was unbearable prompting ED visit. Denies chest pain, bloody emesis, changes in BM, fevers/chills, sob, dizziness, loc and other assocaited symptoms. LBM this morning, normal, no blood or melena.     Abdominal pain -- chronic, but with marked recent increase    CT of abdomen read as normal, but US shows gallstones    Seen by surgery (Kayli) who feels this is consistent w/ acute on chronic cholecystitis    S/p lap pietro on 3/7    Zosyn added. F/u blood cx.     Patient endorse diffuse abdominal pain. C/w pain control and AXR    Diabetes type 2 -- new diagnosis    A1c = 10.1    Lantus 6 units + SSI    Can hold while npo    At discharge, can probably transition to oral med such as metformin    PT c/s pending     Disp: Pending clinical improvement.

## 2022-03-08 NOTE — PROGRESS NOTE ADULT - SUBJECTIVE AND OBJECTIVE BOX
Patient is a 51y old  Female who presents with a chief complaint of Abdominal pain and nausea. (08 Mar 2022 11:20)    INTERVAL HPI/OVERNIGHT EVENTS: No acute events overnight. Patient continues to endorse abdominal pain. Denies nausea and vomiting.     MEDICATIONS  (STANDING):  acetaminophen     Tablet .. 650 milliGRAM(s) Oral every 6 hours  dextrose 40% Gel 15 Gram(s) Oral once  dextrose 5%. 1000 milliLiter(s) (50 mL/Hr) IV Continuous <Continuous>  dextrose 5%. 1000 milliLiter(s) (100 mL/Hr) IV Continuous <Continuous>  dextrose 50% Injectable 25 Gram(s) IV Push once  dextrose 50% Injectable 12.5 Gram(s) IV Push once  dextrose 50% Injectable 25 Gram(s) IV Push once  glucagon  Injectable 1 milliGRAM(s) IntraMuscular once  heparin   Injectable 7500 Unit(s) SubCutaneous every 12 hours  influenza   Vaccine 0.5 milliLiter(s) IntraMuscular once  insulin glargine Injectable (LANTUS) 6 Unit(s) SubCutaneous at bedtime  insulin lispro (ADMELOG) corrective regimen sliding scale   SubCutaneous three times a day before meals  morphine  - Injectable 2 milliGRAM(s) IV Push once  nicotine -  14 mG/24Hr(s) Patch 1 patch Transdermal daily  nicotine -  14 mG/24Hr(s) Patch 1 patch Transdermal daily  pantoprazole  Injectable 40 milliGRAM(s) IV Push daily  piperacillin/tazobactam IVPB.. 3.375 Gram(s) IV Intermittent every 8 hours  potassium chloride    Tablet ER 40 milliEquivalent(s) Oral once    MEDICATIONS  (PRN):  ondansetron Injectable 4 milliGRAM(s) IV Push every 6 hours PRN Nausea and/or Vomiting  oxyCODONE    IR 5 milliGRAM(s) Oral every 6 hours PRN Moderate Pain (4 - 6)      Allergies    No Known Allergies    Intolerances        REVIEW OF SYSTEMS: all negative with exception of above    Vital Signs Last 24 Hrs  T(C): 37.2 (08 Mar 2022 11:30), Max: 37.3 (08 Mar 2022 10:30)  T(F): 99 (08 Mar 2022 11:30), Max: 99.2 (08 Mar 2022 10:30)  HR: 78 (08 Mar 2022 10:30) (71 - 84)  BP: 112/74 (08 Mar 2022 10:30) (107/73 - 160/87)  BP(mean): --  RR: 18 (08 Mar 2022 10:30) (16 - 22)  SpO2: 95% (08 Mar 2022 10:30) (94% - 99%)    PHYSICAL EXAM:  GENERAL: NAD, well-groomed  NERVOUS SYSTEM:  Alert & Oriented X3, Good concentration  CHEST/LUNG: Clear to percussion bilaterally; No rales, rhonchi, wheezing, or rubs  HEART: Regular rate and rhythm; No murmurs, rubs, or gallops  ABDOMEN: Soft, diffuse abdominal tenderness to palpation, Nondistended; Bowel sounds present  EXTREMITIES:  2+ Peripheral Pulses, No clubbing, cyanosis, or edema    LABS:                        13.2   10.33 )-----------( 364      ( 08 Mar 2022 11:37 )             39.3     03-08    136  |  103  |  9   ----------------------------<  277<H>  3.2<L>   |  26  |  1.06    Ca    8.7      08 Mar 2022 11:37  Phos  2.8     03-07  Mg     2.3     03-07    TPro  7.9  /  Alb  3.5  /  TBili  0.7  /  DBili  x   /  AST  41<H>  /  ALT  46  /  AlkPhos  96  03-08        CAPILLARY BLOOD GLUCOSE      POCT Blood Glucose.: 281 mg/dL (08 Mar 2022 10:51)  POCT Blood Glucose.: 174 mg/dL (08 Mar 2022 07:37)  POCT Blood Glucose.: 267 mg/dL (07 Mar 2022 21:05)  POCT Blood Glucose.: 224 mg/dL (07 Mar 2022 16:41)      RADIOLOGY & ADDITIONAL TESTS:    Imaging Personally Reviewed:  [ ] YES  [ ] NO    Consultant(s) Notes Reviewed:  [ ] YES  [ ] NO    Care Discussed with Consultants/Other Providers [ ] YES  [ ] NO

## 2022-03-08 NOTE — DIETITIAN INITIAL EVALUATION ADULT. - OTHER CALCULATIONS
Ht (cm):  160   Wt (kg):  97.1 (3/4)    BMI: 37.9      IBW:  52.3 kg  %IBW:  186%  UBW: stable  %UBW: 100%

## 2022-03-08 NOTE — DISCHARGE NOTE PROVIDER - NSDCMRMEDTOKEN_GEN_ALL_CORE_FT
acetaminophen 325 mg oral tablet: 2 tab(s) orally every 6 hours, As needed, Temp greater or equal to 38C (100.4F), Mild Pain (1 - 3)  nicotine 14 mg/24 hr transdermal film, extended release: transdermal once a day   acetaminophen 325 mg oral tablet: 2 tab(s) orally every 6 hours, As needed, Temp greater or equal to 38C (100.4F), Mild Pain (1 - 3)  metFORMIN 500 mg oral tablet: 1 tab(s) orally 2 times a day   nicotine 14 mg/24 hr transdermal film, extended release: transdermal once a day

## 2022-03-08 NOTE — DISCHARGE NOTE PROVIDER - HOSPITAL COURSE
52 yo female PMH of "gastritis" admitted with epigastric pain, CT read as normal, but US showed gallstones.  Evaluated by surgery, symptoms felt to be consistent with acute on chronic cholecystitis.  S/p Lap pietro 3/7.   s/p course of Zosyn.  Noted with elevated LFTs after procedure.  MRCP not suggestive of choledocholithiasis.      # Acute on Chronic Cholecystitis - symptoms improved.  Elevated LFTs noted, MRCP not suggestive of choledocholithiasis.  Surgery following.  GI followup.  Advanced diet and patient tolerated well.  LFTs improved.    # Diabetes Type II - new diagnosis   A1c = 10.1   metformin started on DC and has to follow up outpatient.     Stable for DC home w/ f/u with PCP and gen surgery.

## 2022-03-08 NOTE — DIETITIAN INITIAL EVALUATION ADULT. - OTHER INFO
Pt admitted c abdominal pain; found c gallstones; s/p lap pietro yesterday; on solid foods today.  Interviewed pt (c daughter) via video ; Miguel #[id number]. Pt reports wt has been stable; no diet restrictions followed at home; appetite is usually good.  Pt c new DM dx; most likely will be sent home on oral medication per MD note. Reviewed CHO foods & beverages & importance of CHO portion control. Provided educational (in Slovak) along c RD contact info.

## 2022-03-08 NOTE — DISCHARGE NOTE PROVIDER - CARE PROVIDERS DIRECT ADDRESSES
,DirectAddress_Unknown ,DirectAddress_Unknown,[email or web address]

## 2022-03-08 NOTE — DIETITIAN INITIAL EVALUATION ADULT. - PERTINENT MEDS FT
Heparin, Zosyn, lactated ringers bolus, Lantus, Admelog sliding scale, Morphine, Oxycodone IR, Nicoderm, Zofran, Protonix

## 2022-03-08 NOTE — DISCHARGE NOTE PROVIDER - NSDCFUADDINST_GEN_ALL_CORE_FT
OTIFY YOUR SURGEON IF: You have any bleeding that does not stop, any pus draining from your wound, any fever (over 100.4 F) or chills, persistent nausea/vomiting, persistent diarrhea, or if your pain is not controlled on your discharge pain medications.    Wound: You may take off outer pink dressing and shower after 48 hours. After showering, pat steri strips dry. Leave the white steri strips in place, they will fall off on their own in approximately 5-7 days or they will be removed at your follow up appointment.

## 2022-03-08 NOTE — PROGRESS NOTE ADULT - SUBJECTIVE AND OBJECTIVE BOX
Patient seen and examined resting, now POD#1 s/p laparoscopic cholecystectomy.  No Complaints offered at this time.  Abdominal pain is well controlled  No nausea, no vomiting, tolerating a regular diet.        MEDICATIONS  (STANDING):  dextrose 40% Gel 15 Gram(s) Oral once  dextrose 5%. 1000 milliLiter(s) (50 mL/Hr) IV Continuous <Continuous>  dextrose 5%. 1000 milliLiter(s) (100 mL/Hr) IV Continuous <Continuous>  dextrose 50% Injectable 25 Gram(s) IV Push once  dextrose 50% Injectable 12.5 Gram(s) IV Push once  dextrose 50% Injectable 25 Gram(s) IV Push once  glucagon  Injectable 1 milliGRAM(s) IntraMuscular once  heparin   Injectable 7500 Unit(s) SubCutaneous every 12 hours  influenza   Vaccine 0.5 milliLiter(s) IntraMuscular once  insulin glargine Injectable (LANTUS) 6 Unit(s) SubCutaneous at bedtime  insulin lispro (ADMELOG) corrective regimen sliding scale   SubCutaneous three times a day before meals  nicotine -  14 mG/24Hr(s) Patch 1 patch Transdermal daily  nicotine -  14 mG/24Hr(s) Patch 1 patch Transdermal daily  pantoprazole  Injectable 40 milliGRAM(s) IV Push daily  piperacillin/tazobactam IVPB.. 3.375 Gram(s) IV Intermittent every 8 hours    MEDICATIONS  (PRN):  acetaminophen     Tablet .. 650 milliGRAM(s) Oral every 6 hours PRN Temp greater or equal to 38C (100.4F), Mild Pain (1 - 3)  ondansetron Injectable 4 milliGRAM(s) IV Push every 6 hours PRN Nausea and/or Vomiting      Vital Signs Last 24 Hrs  T(C): 37.3 (08 Mar 2022 10:30), Max: 37.3 (08 Mar 2022 10:30)  T(F): 99.2 (08 Mar 2022 10:30), Max: 99.2 (08 Mar 2022 10:30)  HR: 78 (08 Mar 2022 10:30) (66 - 84)  BP: 112/74 (08 Mar 2022 10:30) (106/70 - 160/87)  RR: 18 (08 Mar 2022 10:30) (16 - 22)  SpO2: 95% (08 Mar 2022 10:30) (94% - 99%)    PHYSICAL EXAM:  General: NAD  Neuro:  Alert & oriented x 3  HEENT: NCAT, EOMI, conjunctiva clear  CV: +S1+S2 regular rate and rhythm  Lung: clear to ausculation bilaterally, respirations nonlabored, good inspiratory effort  Abdomen: soft, NTND. Normoactive BS. Pink foam dressings C/D/I  Extremities: no pedal edema or calf tenderness noted           LABS:                        12.3   7.65  )-----------( 310      ( 07 Mar 2022 07:25 )             38.3     03-07    138  |  106  |  5<L>  ----------------------------<  174<H>  3.7   |  26  |  0.51    Ca    8.7      07 Mar 2022 07:25  Phos  2.8     03-07  Mg     2.3     03-07    TPro  6.7  /  Alb  3.0<L>  /  TBili  0.8  /  DBili  0.1  /  AST  19  /  ALT  22  /  AlkPhos  64  03-07

## 2022-03-08 NOTE — DISCHARGE NOTE PROVIDER - NSDCCPCAREPLAN_GEN_ALL_CORE_FT
PRINCIPAL DISCHARGE DIAGNOSIS  Diagnosis: Intractable abdominal pain  Assessment and Plan of Treatment:

## 2022-03-08 NOTE — DISCHARGE NOTE PROVIDER - CARE PROVIDER_API CALL
Brigido Milan  [address], [address]  [place], [place]  Phone: [phone]  Fax: (   )    -  Follow Up Time:    Brigido Milan  [address]  [place], [place]  Phone: [phone]  Fax: (   )    -  Follow Up Time:     Jermaine Arcos  [facility]  [address], [address]  [place] [address]  Phone: [phone]  Fax: [phone]  Follow Up Time:

## 2022-03-08 NOTE — DIETITIAN INITIAL EVALUATION ADULT. - PERTINENT LABORATORY DATA
03-08 Na136 mmol/L Glu 277 mg/dL<H> K+ 3.2 mmol/L<L> Cr  1.06 mg/dL BUN 9 mg/dL 03-07 Phos 2.8 mg/dL 03-08 Alb 3.5 g/dL  03-05-22  A1C 10.1%, average glu 243; 03-07 POCT: 170, 142, 224, 267

## 2022-03-08 NOTE — PROGRESS NOTE ADULT - ASSESSMENT
51F with PMH of uncontrolled DM, with acute cholecystitis, POD#0 s/p laparoscopic cholecystectomy    - DVT prophylaxis, Incentive Spirometer, OOB, Ambulating, pain control  Stable for DC, with F/U with Dr. Milan in the office in 10-14 days.

## 2022-03-09 DIAGNOSIS — R79.89 OTHER SPECIFIED ABNORMAL FINDINGS OF BLOOD CHEMISTRY: ICD-10-CM

## 2022-03-09 DIAGNOSIS — K81.0 ACUTE CHOLECYSTITIS: ICD-10-CM

## 2022-03-09 LAB
ALBUMIN SERPL ELPH-MCNC: 3.1 G/DL — LOW (ref 3.3–5)
ALBUMIN SERPL ELPH-MCNC: 3.1 G/DL — LOW (ref 3.3–5)
ALP SERPL-CCNC: 194 U/L — HIGH (ref 40–120)
ALP SERPL-CCNC: 213 U/L — HIGH (ref 40–120)
ALT FLD-CCNC: 184 U/L — HIGH (ref 12–78)
ALT FLD-CCNC: 231 U/L — HIGH (ref 12–78)
AMYLASE P1 CFR SERPL: 25 U/L — SIGNIFICANT CHANGE UP (ref 25–115)
AMYLASE P1 CFR SERPL: 28 U/L — SIGNIFICANT CHANGE UP (ref 25–115)
ANION GAP SERPL CALC-SCNC: 6 MMOL/L — SIGNIFICANT CHANGE UP (ref 5–17)
ANION GAP SERPL CALC-SCNC: 8 MMOL/L — SIGNIFICANT CHANGE UP (ref 5–17)
AST SERPL-CCNC: 274 U/L — HIGH (ref 15–37)
AST SERPL-CCNC: 294 U/L — HIGH (ref 15–37)
BILIRUB DIRECT SERPL-MCNC: 2.5 MG/DL — HIGH (ref 0–0.3)
BILIRUB INDIRECT FLD-MCNC: 0.5 MG/DL — SIGNIFICANT CHANGE UP (ref 0.2–1)
BILIRUB SERPL-MCNC: 2.3 MG/DL — HIGH (ref 0.2–1.2)
BILIRUB SERPL-MCNC: 3 MG/DL — HIGH (ref 0.2–1.2)
BUN SERPL-MCNC: 4 MG/DL — LOW (ref 7–23)
BUN SERPL-MCNC: 7 MG/DL — SIGNIFICANT CHANGE UP (ref 7–23)
CALCIUM SERPL-MCNC: 8.5 MG/DL — SIGNIFICANT CHANGE UP (ref 8.5–10.1)
CALCIUM SERPL-MCNC: 9 MG/DL — SIGNIFICANT CHANGE UP (ref 8.5–10.1)
CHLORIDE SERPL-SCNC: 104 MMOL/L — SIGNIFICANT CHANGE UP (ref 96–108)
CHLORIDE SERPL-SCNC: 107 MMOL/L — SIGNIFICANT CHANGE UP (ref 96–108)
CO2 SERPL-SCNC: 25 MMOL/L — SIGNIFICANT CHANGE UP (ref 22–31)
CO2 SERPL-SCNC: 25 MMOL/L — SIGNIFICANT CHANGE UP (ref 22–31)
CREAT SERPL-MCNC: 0.61 MG/DL — SIGNIFICANT CHANGE UP (ref 0.5–1.3)
CREAT SERPL-MCNC: 0.63 MG/DL — SIGNIFICANT CHANGE UP (ref 0.5–1.3)
EGFR: 109 ML/MIN/1.73M2 — SIGNIFICANT CHANGE UP
EGFR: 110 ML/MIN/1.73M2 — SIGNIFICANT CHANGE UP
GLUCOSE BLDC GLUCOMTR-MCNC: 120 MG/DL — HIGH (ref 70–99)
GLUCOSE BLDC GLUCOMTR-MCNC: 155 MG/DL — HIGH (ref 70–99)
GLUCOSE BLDC GLUCOMTR-MCNC: 174 MG/DL — HIGH (ref 70–99)
GLUCOSE BLDC GLUCOMTR-MCNC: 231 MG/DL — HIGH (ref 70–99)
GLUCOSE SERPL-MCNC: 136 MG/DL — HIGH (ref 70–99)
GLUCOSE SERPL-MCNC: 196 MG/DL — HIGH (ref 70–99)
HCT VFR BLD CALC: 36.4 % — SIGNIFICANT CHANGE UP (ref 34.5–45)
HGB BLD-MCNC: 11.9 G/DL — SIGNIFICANT CHANGE UP (ref 11.5–15.5)
LIDOCAIN IGE QN: 68 U/L — LOW (ref 73–393)
LIDOCAIN IGE QN: 78 U/L — SIGNIFICANT CHANGE UP (ref 73–393)
MCHC RBC-ENTMCNC: 27.5 PG — SIGNIFICANT CHANGE UP (ref 27–34)
MCHC RBC-ENTMCNC: 32.7 G/DL — SIGNIFICANT CHANGE UP (ref 32–36)
MCV RBC AUTO: 84.1 FL — SIGNIFICANT CHANGE UP (ref 80–100)
NRBC # BLD: 0 /100 WBCS — SIGNIFICANT CHANGE UP (ref 0–0)
PLATELET # BLD AUTO: 345 K/UL — SIGNIFICANT CHANGE UP (ref 150–400)
POTASSIUM SERPL-MCNC: 3.7 MMOL/L — SIGNIFICANT CHANGE UP (ref 3.5–5.3)
POTASSIUM SERPL-MCNC: 3.8 MMOL/L — SIGNIFICANT CHANGE UP (ref 3.5–5.3)
POTASSIUM SERPL-SCNC: 3.7 MMOL/L — SIGNIFICANT CHANGE UP (ref 3.5–5.3)
POTASSIUM SERPL-SCNC: 3.8 MMOL/L — SIGNIFICANT CHANGE UP (ref 3.5–5.3)
PROT SERPL-MCNC: 7.1 GM/DL — SIGNIFICANT CHANGE UP (ref 6–8.3)
PROT SERPL-MCNC: 7.3 GM/DL — SIGNIFICANT CHANGE UP (ref 6–8.3)
RBC # BLD: 4.33 M/UL — SIGNIFICANT CHANGE UP (ref 3.8–5.2)
RBC # FLD: 13.1 % — SIGNIFICANT CHANGE UP (ref 10.3–14.5)
SODIUM SERPL-SCNC: 137 MMOL/L — SIGNIFICANT CHANGE UP (ref 135–145)
SODIUM SERPL-SCNC: 138 MMOL/L — SIGNIFICANT CHANGE UP (ref 135–145)
SURGICAL PATHOLOGY STUDY: SIGNIFICANT CHANGE UP
WBC # BLD: 6.72 K/UL — SIGNIFICANT CHANGE UP (ref 3.8–10.5)
WBC # FLD AUTO: 6.72 K/UL — SIGNIFICANT CHANGE UP (ref 3.8–10.5)

## 2022-03-09 PROCEDURE — 71275 CT ANGIOGRAPHY CHEST: CPT | Mod: 26

## 2022-03-09 PROCEDURE — 99233 SBSQ HOSP IP/OBS HIGH 50: CPT

## 2022-03-09 PROCEDURE — 74177 CT ABD & PELVIS W/CONTRAST: CPT | Mod: 26

## 2022-03-09 RX ORDER — IOHEXOL 300 MG/ML
30 INJECTION, SOLUTION INTRAVENOUS ONCE
Refills: 0 | Status: COMPLETED | OUTPATIENT
Start: 2022-03-09 | End: 2022-03-09

## 2022-03-09 RX ORDER — DEXTROSE MONOHYDRATE, SODIUM CHLORIDE, AND POTASSIUM CHLORIDE 50; .745; 4.5 G/1000ML; G/1000ML; G/1000ML
1000 INJECTION, SOLUTION INTRAVENOUS
Refills: 0 | Status: DISCONTINUED | OUTPATIENT
Start: 2022-03-09 | End: 2022-03-11

## 2022-03-09 RX ADMIN — PIPERACILLIN AND TAZOBACTAM 25 GRAM(S): 4; .5 INJECTION, POWDER, LYOPHILIZED, FOR SOLUTION INTRAVENOUS at 08:20

## 2022-03-09 RX ADMIN — Medication 1: at 16:18

## 2022-03-09 RX ADMIN — Medication 650 MILLIGRAM(S): at 00:23

## 2022-03-09 RX ADMIN — Medication 1 PATCH: at 20:19

## 2022-03-09 RX ADMIN — HEPARIN SODIUM 7500 UNIT(S): 5000 INJECTION INTRAVENOUS; SUBCUTANEOUS at 18:22

## 2022-03-09 RX ADMIN — PANTOPRAZOLE SODIUM 40 MILLIGRAM(S): 20 TABLET, DELAYED RELEASE ORAL at 12:16

## 2022-03-09 RX ADMIN — Medication 1 PATCH: at 12:24

## 2022-03-09 RX ADMIN — Medication 650 MILLIGRAM(S): at 13:20

## 2022-03-09 RX ADMIN — Medication 1 PATCH: at 12:16

## 2022-03-09 RX ADMIN — Medication 650 MILLIGRAM(S): at 12:20

## 2022-03-09 RX ADMIN — Medication 2: at 12:15

## 2022-03-09 RX ADMIN — ONDANSETRON 4 MILLIGRAM(S): 8 TABLET, FILM COATED ORAL at 06:17

## 2022-03-09 RX ADMIN — Medication 1 PATCH: at 08:19

## 2022-03-09 RX ADMIN — HEPARIN SODIUM 7500 UNIT(S): 5000 INJECTION INTRAVENOUS; SUBCUTANEOUS at 06:16

## 2022-03-09 RX ADMIN — IOHEXOL 30 MILLILITER(S): 300 INJECTION, SOLUTION INTRAVENOUS at 11:00

## 2022-03-09 RX ADMIN — PIPERACILLIN AND TAZOBACTAM 25 GRAM(S): 4; .5 INJECTION, POWDER, LYOPHILIZED, FOR SOLUTION INTRAVENOUS at 16:20

## 2022-03-09 RX ADMIN — Medication 1: at 08:48

## 2022-03-09 NOTE — CONSULT NOTE ADULT - ASSESSMENT
51 y/.o woman with a h/o acute cholecystitis POD#2 s/p laparoscopic cholecystectomy complaining of mid abdominal pain and elevated LFTs    POD 2 cholecystectomy   Labs:  TBili  2.3, AST  294, , AlkPhos 194    Differential for acute LFT elevation after cholecystectomy includes bile leak, biloma, retained stone.   CT, MRCP pending for further evaluation   Trend LFTs    51 y/.o woman with a h/o acute cholecystitis POD#2 s/p laparoscopic cholecystectomy complaining of mid abdominal pain and elevated LFTs    POD 2 cholecystectomy   Labs:  TBili  2.3, AST  294, , AlkPhos 194 (Note: LFTs unremarkable prior to cholecystectomy)    Differential for acute LFT elevation after cholecystectomy includes bile leak, biloma, retained stone.   CT, MRCP pending for further evaluation   Trend LFTs

## 2022-03-09 NOTE — PROGRESS NOTE ADULT - ASSESSMENT
Differential includes retained stone, bile duct injury, cystic stump leak    NPO, IVF  CT abdomen/pelvis with PO and IV contrast to rule out biloma  Repat LFTs today  GI eval for possible ERCP

## 2022-03-09 NOTE — PHYSICAL THERAPY INITIAL EVALUATION ADULT - GENERAL OBSERVATIONS, REHAB EVAL
Chart (EMR) reviewed. Received sitting at edge of bed, NAD. +heplock, +O2 via nasal cannula on standby, +dressing to abdomen intact. Language barrier(Upper sorbian) c video  Melony#[id number] utilized. Alert. Ox4. Able to follow multistep commands/directions.

## 2022-03-09 NOTE — PROGRESS NOTE ADULT - ASSESSMENT
52 yo female PMH of "gastritis" presents complanining of sudden onset sharp epigastric pain associated with n/v, poor po intolerance since this AM. Reports she woke up and felt fine but then after breakfast abdominal pain began. Pain was unbearable prompting ED visit. Denies chest pain, bloody emesis, changes in BM, fevers/chills, sob, dizziness, loc and other assocaited symptoms. LBM this morning, normal, no blood or melena.     Abdominal pain -- chronic, but with marked recent increase    CT of abdomen read as normal, but US shows gallstones    Seen by surgery (Kayli) who feels this is consistent w/ acute on chronic cholecystitis    S/p lap pietro on 3/7    Zosyn D5/7. F/u blood cx- NGTD    Patient endorse diffuse abdominal pain. C/w pain control   - Appreciate surgery recs- F/u CTA chest to r/o PE and CT A/P given transaminitis. If concerning for PE, will start heparin gtt    Diabetes type 2 -- new diagnosis    A1c = 10.1    Lantus 6 units + SSI    Can hold while npo    At discharge, can probably transition to oral med such as metformin    PT c/s pending     Disp: Pending clinical improvement.  Unable to contact patient's son. Rudy ([phone]) on 3/9.

## 2022-03-09 NOTE — PHYSICAL THERAPY INITIAL EVALUATION ADULT - ADDITIONAL COMMENTS
Patient lives c  in the basement of a pvt house c 5 entry steps (no rail), 1 flight of stairs (no rail, b/l walls) to go down the basement. Independent c all ADL's and ambulation without device.

## 2022-03-09 NOTE — PROGRESS NOTE ADULT - SUBJECTIVE AND OBJECTIVE BOX
Patient seen and examined at bedside resting.  Admits to abdominal pain, states her pain is not any worse than yesterday. She also admits to mild abdominal discomfort when eating.   Voiding on own, +flatus.  With SOB per RN.   Denies fever, chills, N/V/D, CP.     Vital Signs Last 24 Hrs  T(F): 97.7 (03-09-22 @ 11:35), Max: 98.9 (03-09-22 @ 00:14)  HR: 76 (03-09-22 @ 11:35)  BP: 133/84 (03-09-22 @ 11:35)  RR: 18 (03-09-22 @ 11:35)  SpO2: 97% (03-09-22 @ 11:35)  POCT Blood Glucose.: 231 mg/dL (09 Mar 2022 10:58)    PHYSICAL EXAM:  GENERAL: Alert, NAD  CHEST/LUNG: Clear to auscultation bilaterally, respirations nonlabored  HEART: Regular rate and rhythm; S1 & S2 appreciated  ABDOMEN: soft, ttp at RUQ and incision sites, non distended. Steri strips intact.   EXTREMITIES: no calf tenderness, no edema    I&O's Detail    LABS:                        11.9   6.72  )-----------( 345      ( 09 Mar 2022 06:54 )             36.4     03-09    138  |  107  |  7   ----------------------------<  196<H>  3.8   |  25  |  0.63    Ca    8.5      09 Mar 2022 06:54    TPro  7.1  /  Alb  3.1<L>  /  TBili  2.3<H>  /  DBili  x   /  AST  294<H>  /  ALT  184<H>  /  AlkPhos  194<H>  03-09    A/P  51F with PMH of uncontrolled DM, with acute cholecystitis, POD#2 s/p laparoscopic cholecystectomy  With elevated LFTs, Tbili 2.6  Amylase/Lipase WNL    - NPO, IVF  - f/u CT A/P and CTA (c/o SOB)  - IV abx  - analgesia prn  - trend LFTs  - DVT ppx, OOB/AAT  - d/w Dr. Milan and Dr. Starks

## 2022-03-09 NOTE — PHYSICAL THERAPY INITIAL EVALUATION ADULT - PERTINENT HX OF CURRENT PROBLEM, REHAB EVAL
Patient is a 50 y/o female admitted to [facility] due to intractable abdominal pain. Pt underwent S/P laparoscopic cholecystectomy on 3/7/2022.

## 2022-03-09 NOTE — PROGRESS NOTE ADULT - SUBJECTIVE AND OBJECTIVE BOX
Patient is a 51y old  Female who presents with a chief complaint of Abdominal pain and nausea. (08 Mar 2022 14:33)      INTERVAL HPI/OVERNIGHT EVENTS: Patient was endosing SOB today morning and required oxygen supplementation. She continues to endorse mild diffuse abdominal pain.     MEDICATIONS  (STANDING):  acetaminophen     Tablet .. 650 milliGRAM(s) Oral every 6 hours  dextrose 40% Gel 15 Gram(s) Oral once  dextrose 5%. 1000 milliLiter(s) (50 mL/Hr) IV Continuous <Continuous>  dextrose 5%. 1000 milliLiter(s) (100 mL/Hr) IV Continuous <Continuous>  dextrose 50% Injectable 25 Gram(s) IV Push once  dextrose 50% Injectable 12.5 Gram(s) IV Push once  dextrose 50% Injectable 25 Gram(s) IV Push once  glucagon  Injectable 1 milliGRAM(s) IntraMuscular once  heparin   Injectable 7500 Unit(s) SubCutaneous every 12 hours  influenza   Vaccine 0.5 milliLiter(s) IntraMuscular once  insulin glargine Injectable (LANTUS) 6 Unit(s) SubCutaneous at bedtime  insulin lispro (ADMELOG) corrective regimen sliding scale   SubCutaneous three times a day before meals  morphine  - Injectable 2 milliGRAM(s) IV Push once  nicotine -  14 mG/24Hr(s) Patch 1 patch Transdermal daily  pantoprazole  Injectable 40 milliGRAM(s) IV Push daily  piperacillin/tazobactam IVPB.. 3.375 Gram(s) IV Intermittent every 8 hours    MEDICATIONS  (PRN):  morphine  - Injectable 2 milliGRAM(s) IV Push every 6 hours PRN Severe Pain (7 - 10)  ondansetron Injectable 4 milliGRAM(s) IV Push every 6 hours PRN Nausea and/or Vomiting  oxyCODONE    IR 5 milliGRAM(s) Oral every 6 hours PRN Moderate Pain (4 - 6)      Allergies    No Known Allergies    Intolerances        REVIEW OF SYSTEMS: all negative with exception of above    Vital Signs Last 24 Hrs  T(C): 36.5 (09 Mar 2022 11:35), Max: 37.2 (09 Mar 2022 00:14)  T(F): 97.7 (09 Mar 2022 11:35), Max: 98.9 (09 Mar 2022 00:14)  HR: 76 (09 Mar 2022 11:35) (67 - 76)  BP: 133/84 (09 Mar 2022 11:35) (120/78 - 133/84)  BP(mean): --  RR: 18 (09 Mar 2022 11:35) (18 - 18)  SpO2: 97% (09 Mar 2022 11:35) (96% - 97%)    PHYSICAL EXAM:  GENERAL: NAD, well-groomed  NERVOUS SYSTEM:  Alert & Oriented X3, Good concentration  CHEST/LUNG: Clear to percussion bilaterally; No rales, rhonchi, wheezing, or rubs  HEART: Regular rate and rhythm; No murmurs, rubs, or gallops  ABDOMEN: Soft, diffuse abdominal tenderness to palpation, Nondistended; Bowel sounds present  EXTREMITIES:  2+ Peripheral Pulses, No clubbing, cyanosis, or edemalesions    LABS:                        11.9   6.72  )-----------( 345      ( 09 Mar 2022 06:54 )             36.4     03-09    138  |  107  |  7   ----------------------------<  196<H>  3.8   |  25  |  0.63    Ca    8.5      09 Mar 2022 06:54    TPro  7.1  /  Alb  3.1<L>  /  TBili  2.3<H>  /  DBili  x   /  AST  294<H>  /  ALT  184<H>  /  AlkPhos  194<H>  03-09        CAPILLARY BLOOD GLUCOSE      POCT Blood Glucose.: 231 mg/dL (09 Mar 2022 10:58)  POCT Blood Glucose.: 174 mg/dL (09 Mar 2022 08:32)  POCT Blood Glucose.: 227 mg/dL (08 Mar 2022 21:25)  POCT Blood Glucose.: 207 mg/dL (08 Mar 2022 16:05)      RADIOLOGY & ADDITIONAL TESTS:    Imaging Personally Reviewed:  [ ] YES  [ ] NO    Consultant(s) Notes Reviewed:  [ ] YES  [ ] NO    Care Discussed with Consultants/Other Providers [ ] YES  [ ] NO

## 2022-03-09 NOTE — PHYSICAL THERAPY INITIAL EVALUATION ADULT - GAIT TRAINING, PT EVAL
Pt will improve ambulation to ~ 200 feet Independently without device within 2 weeks. Pt will negotiate ~ 10 steps Independently within 2 weeks.

## 2022-03-09 NOTE — PROGRESS NOTE ADULT - SUBJECTIVE AND OBJECTIVE BOX
Pt is a 51 y/.o woman with a h/o acute cholecystitis POD#2 s/p laparoscopic cholecystectomy complaining of mid abdominal pain and elevated LFTs    Minimal tenderness at mid abdomen  No jaundice on exam  Incisions CDI

## 2022-03-09 NOTE — CONSULT NOTE ADULT - PROBLEM SELECTOR RECOMMENDATION 9
-CT pending for further evaluation  -MRCP pending to r/o choledocholithiasis   -Trend LFTs  -Avoid hepatotoxic agents -CT pending for further evaluation  -MRCP pending to r/o choledocholithiasis   -Continue empiric abx; zosyn   -Trend LFTs  -Avoid hepatotoxic agents

## 2022-03-09 NOTE — CONSULT NOTE ADULT - SUBJECTIVE AND OBJECTIVE BOX
Chief Complaint:  Patient is a 51y old  Female who presents with a chief complaint of Acute Cholecystitis s/p Laparoscopic Cholecystectomy (09 Mar 2022 13:08)      HPI:  50 yo female PMH of gastritis presents complanining of sudden onset sharp epigastric pain associated with n/v, poor po intolerance since this AM. Reports she woke up and felt fine but then after breakfast abdominal pain began. Pain was unbearable prompting ED visit. Denies chest pain, bloody emesis, changes in BM, fevers/chills, sob, dizziness, loc and other assocaited symptoms. LBM this morning, normal, no blood or melena.  (04 Mar 2022 19:53). GI Consulted for evaluation to r/o retained stone after cholecystectomy, POD #2.      PMH/PSH:PAST MEDICAL & SURGICAL HISTORY:      Allergies:  No Known Allergies      Medications:  acetaminophen     Tablet .. 650 milliGRAM(s) Oral every 6 hours  dextrose 40% Gel 15 Gram(s) Oral once  dextrose 5%. 1000 milliLiter(s) IV Continuous <Continuous>  dextrose 5%. 1000 milliLiter(s) IV Continuous <Continuous>  dextrose 50% Injectable 25 Gram(s) IV Push once  dextrose 50% Injectable 12.5 Gram(s) IV Push once  dextrose 50% Injectable 25 Gram(s) IV Push once  glucagon  Injectable 1 milliGRAM(s) IntraMuscular once  heparin   Injectable 7500 Unit(s) SubCutaneous every 12 hours  influenza   Vaccine 0.5 milliLiter(s) IntraMuscular once  insulin glargine Injectable (LANTUS) 6 Unit(s) SubCutaneous at bedtime  insulin lispro (ADMELOG) corrective regimen sliding scale   SubCutaneous three times a day before meals  morphine  - Injectable 2 milliGRAM(s) IV Push every 6 hours PRN  morphine  - Injectable 2 milliGRAM(s) IV Push once  nicotine -  14 mG/24Hr(s) Patch 1 patch Transdermal daily  ondansetron Injectable 4 milliGRAM(s) IV Push every 6 hours PRN  oxyCODONE    IR 5 milliGRAM(s) Oral every 6 hours PRN  pantoprazole  Injectable 40 milliGRAM(s) IV Push daily  piperacillin/tazobactam IVPB.. 3.375 Gram(s) IV Intermittent every 8 hours      Review of Systems:    General:  No weight loss, fevers, chills, night sweats, fatigue,   Eyes:  Good vision, no reported pain  ENT:  No sore throat, pain, runny nose, dysphagia  CV:  No pain, palpitations, hypo/hypertension  Resp:  No dyspnea, cough, tachypnea, wheezing  GI:  as per HPI   :  No pain, bleeding, incontinence, nocturia  Muscle:  No pain, weakness  Breast:  No pain, abscess, mass, discharge  Neuro:  No weakness, tingling, memory problems  Psych:  No fatigue, insomnia, mood problems, depression  Endocrine:  No polyuria, polydipsia, cold/heat intolerance  Heme:  No petechiae, ecchymosis, easy bruisability  Skin:  No rash, tattoos, scars, edema    Relevant Family History:   FAMILY HISTORY:      Relevant Social History: Alcohol ( -) , Tobacco ( -) , Illicit drugs (- )     Physical Exam:    Vital Signs:  Vital Signs Last 24 Hrs  T(C): 37 (09 Mar 2022 16:58), Max: 37.2 (09 Mar 2022 00:14)  T(F): 98.6 (09 Mar 2022 16:58), Max: 98.9 (09 Mar 2022 00:14)  HR: 69 (09 Mar 2022 16:58) (67 - 76)  BP: 115/75 (09 Mar 2022 16:58) (115/75 - 133/84)  BP(mean): --  RR: 18 (09 Mar 2022 16:58) (18 - 18)  SpO2: 95% (09 Mar 2022 16:58) (95% - 97%)  Daily     Daily     General:  NAD  HEENT:  NC/AT,  conjunctivae clear and pink, no thyromegaly, nodules, adenopathy, no JVD, anicteric sclera  Chest:  Full & symmetric excursion, no increased effort, breath sounds clear  Cardiovascular:  Regular rhythm, S1, S2, no murmur/rub/S3/S4, no abdominal bruit, no edema  Abdomen:  Soft, mild diffuse ttp, non distended, normoactive bowel sounds,  no masses, incision c/d/i  Extremities:  no cyanosis, clubbing or edema  Skin:  No rash/erythema/ecchymoses/petechiae/wounds/abscess/warm/dry  Neuro/Psych:  Alert, oriented, no asterixis, no tremor, no encephalopathy    Laboratory:                          11.9   6.72  )-----------( 345      ( 09 Mar 2022 06:54 )             36.4     03-09    138  |  107  |  7   ----------------------------<  196<H>  3.8   |  25  |  0.63    Ca    8.5      09 Mar 2022 06:54    TPro  7.1  /  Alb  3.1<L>  /  TBili  2.3<H>  /  DBili  x   /  AST  294<H>  /  ALT  184<H>  /  AlkPhos  194<H>  03-09    LIVER FUNCTIONS - ( 09 Mar 2022 06:54 )  Alb: 3.1 g/dL / Pro: 7.1 gm/dL / ALK PHOS: 194 U/L / ALT: 184 U/L / AST: 294 U/L / GGT: x               Amylase Serum28 U/L      Lipase serum78 U/L       Ammonia--      Intake and Output      Imaging:  < from: CT Abdomen and Pelvis w/ Oral Cont and w/ IV Cont (03.09.22 @ 17:05) >    ACC: [id number] EXAM:  CT ABDOMEN AND PELVIS [facility]                        ACC: [id number] EXAM:  CT ANGIO CHEST PULM ART [facility]                          PROCEDURE DATE:  03/09/2022          INTERPRETATION:  Reason for Exam: Shortness of breath. chest pain.    CTA of the chest was performed from the thoracic inlet to the level of   the adrenal glands following IV contrast injection of  80 cc of Omnipaque   350. No immediate complications were reported.  MIP images were also   created and reviewed. CTof the abdomen and pelvis was also obtained    Comparison: CT abdomen and pelvis dated March 4, 2022    CT CHEST:    Tubes/Lines: None    Mediastinum and Heart: Aorta and pulmonary arteries are normal in size.   Thyroid gland is unremarkable. No lymphadenopathy. No pericardial   effusion.    Lungs, Pleura, and Airways: There is no pulmonary embolus. No   consolidations, edema, effusion, or pneumothorax. Patchy areas of   bibasilar atelectasis are noted.    CT abdomen and pelvis: Small amount of free intraperitoneal air is noted    The liver, spleen, pancreas, adrenals and kidneys are unremarkable. No   stones or hydronephrosis. Post cholecystectomy, new since prior exam of   3/4/22.    Retroperitoneal vasculature is within normal limits. Leftretroaortic   renal vein is noted, normal variant. No retroperitoneal lymphadenopathy   is seen.    Pelvic organs are unremarkable. There is no evidence of bowel   obstruction. The appendix is normal.    No acute bony abnormality.    IMPRESSION:    CTA chest: No pulmonary embolism. Bibasilar atelectasis, new    CT abdomen and pelvis:    Small pneumoperitoneum, likely related to interval cholecystectomy.   Otherwise, unremarkable CT abdomen and pelvis.    --- End of Report ---            MELVIN STAPLES MD; Attending Radiologist  This document has been electronically signed. Mar  9 2022  5:21PM    < end of copied text >    
  Chief Complaint:  Patient is a 51y old  Female who presents with a chief complaint of Abdominal pain and nausea. (04 Mar 2022 19:53)      HPI:  52 yo female denies pmh but has A1c of 10, presents to ED this AM for sudden onset sharp epigastric pain starting yesterday, pain is associated with n/v, is worse after eating. Denies chest pain, bloody emesis, changes in BM, fevers/chills, sob, dizziness, loc and other associated symptoms. LBM this morning, normal, no blood or melena.  (04 Mar 2022 19:53)      PMH/PSH:PAST MEDICAL & SURGICAL HISTORY:      Allergies:  No Known Allergies      Medications:  acetaminophen     Tablet .. 650 milliGRAM(s) Oral every 6 hours PRN  dextrose 40% Gel 15 Gram(s) Oral once  dextrose 5%. 1000 milliLiter(s) IV Continuous <Continuous>  dextrose 5%. 1000 milliLiter(s) IV Continuous <Continuous>  dextrose 50% Injectable 25 Gram(s) IV Push once  dextrose 50% Injectable 12.5 Gram(s) IV Push once  dextrose 50% Injectable 25 Gram(s) IV Push once  glucagon  Injectable 1 milliGRAM(s) IntraMuscular once  insulin lispro (ADMELOG) corrective regimen sliding scale   SubCutaneous three times a day before meals  lactated ringers. 1000 milliLiter(s) IV Continuous <Continuous>  nicotine -  14 mG/24Hr(s) Patch 1 patch Transdermal daily  ondansetron Injectable 4 milliGRAM(s) IV Push every 6 hours PRN  pantoprazole  Injectable 40 milliGRAM(s) IV Push daily  potassium chloride    Tablet ER 10 milliEquivalent(s) Oral three times a day      REVIEW OF SYSTEMS:  All other review of systems is negative unless indicated above.    Relevant Family History:   FAMILY HISTORY:      Relevant Social History:  Denies ETOH or tobacco history    Physical Exam:    Vital Signs:  Vital Signs Last 24 Hrs  T(C): 37.2 (05 Mar 2022 11:32), Max: 37.2 (04 Mar 2022 22:20)  T(F): 99 (05 Mar 2022 11:32), Max: 99 (04 Mar 2022 22:20)  HR: 77 (05 Mar 2022 11:32) (77 - 81)  BP: 110/60 (05 Mar 2022 11:32) (110/60 - 148/91)  BP(mean): --  RR: 17 (05 Mar 2022 11:32) (16 - 18)  SpO2: 95% (05 Mar 2022 11:32) (95% - 99%)  Daily Height in cm: 160 (04 Mar 2022 22:20)    Daily Weight in k.1 (04 Mar 2022 22:20)    Constitutional: WDWN resting comfortably in bed; NAD, Lithuanian speaking  HEENT: NC/AT, PERRL, EOMI, anicteric sclera, no nasal discharge; uvula midline, no oropharyngeal erythema or exudates  Neck: supple; no JVD or thyromegaly  Respiratory: CTA B/L; no W/R/R, no retractions  Cardiac: +S1/S2; RRR; no M/R/G; PMI non-displaced  Gastrointestinal: soft, tender to RUQ no rebound or guarding; +BS   Extremities: , no clubbing or cyanosis; no peripheral edema  Musculoskeletal:  no joint swelling, tenderness or erythema  Vascular: 2+ radial, femoral, DP/PT pulses B/L  Skin: warm, dry and intact; no rashes, wounds, or scars  Neurologic: AAOx3; CNS grossly intact; no focal deficits no asterixis, no tremor, no encephalopathy    Laboratory:                          14.3   9.80  )-----------( 356      ( 04 Mar 2022 14:39 )             43.0     03-    136  |  103  |  7   ----------------------------<  196<H>  3.3<L>   |  26  |  0.56    Ca    8.8      05 Mar 2022 06:48    TPro  8.4<H>  /  Alb  4.1  /  TBili  0.7  /  DBili  x   /  AST  23  /  ALT  32  /  AlkPhos  108  03-04    LIVER FUNCTIONS - ( 04 Mar 2022 14:39 )  Alb: 4.1 g/dL / Pro: 8.4 gm/dL / ALK PHOS: 108 U/L / ALT: 32 U/L / AST: 23 U/L / GGT: x               Amylase Serum--      Lipase serum64 U/L<L>       Ammonia--      < from: US Abdomen Complete (US Abdomen Complete .) (22 @ 18:29) >  ACC: [id number] EXAM:  US ABDOMEN COMPLETE                          PROCEDURE DATE:  2022          INTERPRETATION:  CLINICAL INFORMATION: Abdominal pain    COMPARISON: CT abdomen pelvis performed on the same day.    TECHNIQUE: Sonography of theabdomen.    FINDINGS:    Liver: Increased echogenicity.  Bile ducts: Normal caliber. Common bile duct measures 0.3 cm.  Gallbladder: Gallstones. No wall thickening. Sonographic Gallego's sign   cannot be accurately evaluated due to premedication.  Pancreas: Visualized portions are within normal limits.  Spleen: 7.8. Within normal limits.  Right kidney: 10.7 cm. No hydronephrosis.  Left kidney: 11.9 cm.  No hydronephrosis.  Ascites: None.  Aorta and IVC: Visualized portions are within normal limits.    IMPRESSION:  Fatty infiltration of the liver.    Gallstones. Sonographic Gallego's sign could not be accurately assessed   because the patient received pain medication.        --- End of Report ---            JOSELO PAK MD; Attending Radiologist  This document has been electronically signed. Mar  4 2022  7:00PM    < end of copied text >

## 2022-03-10 LAB
ALBUMIN SERPL ELPH-MCNC: 3 G/DL — LOW (ref 3.3–5)
ALP SERPL-CCNC: 213 U/L — HIGH (ref 40–120)
ALT FLD-CCNC: 210 U/L — HIGH (ref 12–78)
ANION GAP SERPL CALC-SCNC: 8 MMOL/L — SIGNIFICANT CHANGE UP (ref 5–17)
AST SERPL-CCNC: 183 U/L — HIGH (ref 15–37)
BILIRUB DIRECT SERPL-MCNC: 1 MG/DL — HIGH (ref 0–0.3)
BILIRUB INDIRECT FLD-MCNC: 0.7 MG/DL — SIGNIFICANT CHANGE UP (ref 0.2–1)
BILIRUB SERPL-MCNC: 1.7 MG/DL — HIGH (ref 0.2–1.2)
BUN SERPL-MCNC: 5 MG/DL — LOW (ref 7–23)
CALCIUM SERPL-MCNC: 9.1 MG/DL — SIGNIFICANT CHANGE UP (ref 8.5–10.1)
CHLORIDE SERPL-SCNC: 104 MMOL/L — SIGNIFICANT CHANGE UP (ref 96–108)
CO2 SERPL-SCNC: 22 MMOL/L — SIGNIFICANT CHANGE UP (ref 22–31)
CREAT SERPL-MCNC: 0.62 MG/DL — SIGNIFICANT CHANGE UP (ref 0.5–1.3)
EGFR: 109 ML/MIN/1.73M2 — SIGNIFICANT CHANGE UP
GLUCOSE BLDC GLUCOMTR-MCNC: 170 MG/DL — HIGH (ref 70–99)
GLUCOSE BLDC GLUCOMTR-MCNC: 208 MG/DL — HIGH (ref 70–99)
GLUCOSE BLDC GLUCOMTR-MCNC: 241 MG/DL — HIGH (ref 70–99)
GLUCOSE BLDC GLUCOMTR-MCNC: 272 MG/DL — HIGH (ref 70–99)
GLUCOSE SERPL-MCNC: 204 MG/DL — HIGH (ref 70–99)
HCT VFR BLD CALC: 38.3 % — SIGNIFICANT CHANGE UP (ref 34.5–45)
HGB BLD-MCNC: 12.7 G/DL — SIGNIFICANT CHANGE UP (ref 11.5–15.5)
MCHC RBC-ENTMCNC: 27.7 PG — SIGNIFICANT CHANGE UP (ref 27–34)
MCHC RBC-ENTMCNC: 33.2 G/DL — SIGNIFICANT CHANGE UP (ref 32–36)
MCV RBC AUTO: 83.4 FL — SIGNIFICANT CHANGE UP (ref 80–100)
NRBC # BLD: 0 /100 WBCS — SIGNIFICANT CHANGE UP (ref 0–0)
PLATELET # BLD AUTO: 326 K/UL — SIGNIFICANT CHANGE UP (ref 150–400)
POTASSIUM SERPL-MCNC: 3.9 MMOL/L — SIGNIFICANT CHANGE UP (ref 3.5–5.3)
POTASSIUM SERPL-SCNC: 3.9 MMOL/L — SIGNIFICANT CHANGE UP (ref 3.5–5.3)
PROT SERPL-MCNC: 7.1 GM/DL — SIGNIFICANT CHANGE UP (ref 6–8.3)
RBC # BLD: 4.59 M/UL — SIGNIFICANT CHANGE UP (ref 3.8–5.2)
RBC # FLD: 13.2 % — SIGNIFICANT CHANGE UP (ref 10.3–14.5)
SODIUM SERPL-SCNC: 134 MMOL/L — LOW (ref 135–145)
WBC # BLD: 6.42 K/UL — SIGNIFICANT CHANGE UP (ref 3.8–10.5)
WBC # FLD AUTO: 6.42 K/UL — SIGNIFICANT CHANGE UP (ref 3.8–10.5)

## 2022-03-10 PROCEDURE — 99232 SBSQ HOSP IP/OBS MODERATE 35: CPT

## 2022-03-10 PROCEDURE — 74181 MRI ABDOMEN W/O CONTRAST: CPT | Mod: 26

## 2022-03-10 RX ADMIN — Medication 650 MILLIGRAM(S): at 11:37

## 2022-03-10 RX ADMIN — PANTOPRAZOLE SODIUM 40 MILLIGRAM(S): 20 TABLET, DELAYED RELEASE ORAL at 11:37

## 2022-03-10 RX ADMIN — Medication 1 PATCH: at 11:39

## 2022-03-10 RX ADMIN — HEPARIN SODIUM 7500 UNIT(S): 5000 INJECTION INTRAVENOUS; SUBCUTANEOUS at 06:33

## 2022-03-10 RX ADMIN — Medication 650 MILLIGRAM(S): at 07:39

## 2022-03-10 RX ADMIN — Medication 2: at 17:09

## 2022-03-10 RX ADMIN — INSULIN GLARGINE 6 UNIT(S): 100 INJECTION, SOLUTION SUBCUTANEOUS at 21:58

## 2022-03-10 RX ADMIN — Medication 1: at 11:38

## 2022-03-10 RX ADMIN — Medication 1 PATCH: at 11:37

## 2022-03-10 RX ADMIN — Medication 650 MILLIGRAM(S): at 06:39

## 2022-03-10 RX ADMIN — Medication 2: at 09:05

## 2022-03-10 RX ADMIN — HEPARIN SODIUM 7500 UNIT(S): 5000 INJECTION INTRAVENOUS; SUBCUTANEOUS at 17:09

## 2022-03-10 RX ADMIN — Medication 1 PATCH: at 17:10

## 2022-03-10 RX ADMIN — Medication 650 MILLIGRAM(S): at 12:30

## 2022-03-10 RX ADMIN — DEXTROSE MONOHYDRATE, SODIUM CHLORIDE, AND POTASSIUM CHLORIDE 135 MILLILITER(S): 50; .745; 4.5 INJECTION, SOLUTION INTRAVENOUS at 23:54

## 2022-03-10 RX ADMIN — PIPERACILLIN AND TAZOBACTAM 25 GRAM(S): 4; .5 INJECTION, POWDER, LYOPHILIZED, FOR SOLUTION INTRAVENOUS at 09:05

## 2022-03-10 RX ADMIN — DEXTROSE MONOHYDRATE, SODIUM CHLORIDE, AND POTASSIUM CHLORIDE 135 MILLILITER(S): 50; .745; 4.5 INJECTION, SOLUTION INTRAVENOUS at 00:33

## 2022-03-10 RX ADMIN — PIPERACILLIN AND TAZOBACTAM 25 GRAM(S): 4; .5 INJECTION, POWDER, LYOPHILIZED, FOR SOLUTION INTRAVENOUS at 00:34

## 2022-03-10 RX ADMIN — DEXTROSE MONOHYDRATE, SODIUM CHLORIDE, AND POTASSIUM CHLORIDE 135 MILLILITER(S): 50; .745; 4.5 INJECTION, SOLUTION INTRAVENOUS at 09:14

## 2022-03-10 NOTE — PROGRESS NOTE ADULT - PROBLEM SELECTOR PLAN 1
*CT/MRCP reviewed; no evidence of choledocholithiasis  -Continue empiric abx; zosyn   -Trend LFTs  -Avoid hepatotoxic agents.

## 2022-03-10 NOTE — PROGRESS NOTE ADULT - ASSESSMENT
51 year old female POD #3 s/p Lap pietro with newly diagnosed DM A1c 10    LFT improving  may have carb consistent clears consider advance as tolerated  ambulate as tolerated  Incentive spirometry  pain control  SQH for SCD prophylaxis  continue zosyn

## 2022-03-10 NOTE — PROGRESS NOTE ADULT - SUBJECTIVE AND OBJECTIVE BOX
Gastroenterology  No events overnight   S/p MRCP  Mild abd pain     T(F): 98.3 (03-10-22 @ 11:55), Max: 99.1 (03-09-22 @ 23:37)  HR: 70 (03-10-22 @ 11:55) (68 - 77)  BP: 108/75 (03-10-22 @ 11:55) (108/75 - 117/75)  RR: 18 (03-10-22 @ 11:55) (18 - 18)  SpO2: 96% (03-10-22 @ 11:55) (95% - 96%)  Wt(kg): --  CAPILLARY BLOOD GLUCOSE      POCT Blood Glucose.: 241 mg/dL (10 Mar 2022 16:27)  POCT Blood Glucose.: 170 mg/dL (10 Mar 2022 11:31)  POCT Blood Glucose.: 208 mg/dL (10 Mar 2022 08:48)  POCT Blood Glucose.: 120 mg/dL (09 Mar 2022 21:05)      PHYSICAL EXAM:  General: NAD  Neuro:  Alert & responsive  HEENT: NCAT, EOMI, conjunctiva clear  CV: +S1+S2 regular rate and rhythm  Lung: clear to ausculation bilaterally, respirations nonlabored, good inspiratory effort  Abdomen: soft, mild ttp diffusely, No distention Normal active BS  Extremities: no cyanosis, clubbing or edema    LABS:                        12.7   6.42  )-----------( 326      ( 10 Mar 2022 06:54 )             38.3     03-10    134<L>  |  104  |  5<L>  ----------------------------<  204<H>  3.9   |  22  |  0.62    Ca    9.1      10 Mar 2022 06:54    TPro  7.1  /  Alb  3.0<L>  /  TBili  1.7<H>  /  DBili  1.0<H>  /  AST  183<H>  /  ALT  210<H>  /  AlkPhos  213<H>  03-10    LIVER FUNCTIONS - ( 10 Mar 2022 06:54 )  Alb: 3.0 g/dL / Pro: 7.1 gm/dL / ALK PHOS: 213 U/L / ALT: 210 U/L / AST: 183 U/L / GGT: x             I&O's Detail    03-10 @ 06:54    134 | 104 | 5  /9.1 | -- | --  _______________________/  3.9 | 22 | 0.62                           \par   Amylase, Serum Total: 25 U/L (03-09 @ 18:42)      < from: MR MRCP No Cont (03.10.22 @ 10:53) >    ACC: [id number] EXAM:  MR MRCP                          PROCEDURE DATE:  03/10/2022          INTERPRETATION:  CLINICAL INFORMATION: Status post cholecystectomy.   Worsening abdominal pain    COMPARISON: No prior abdominal MR is available for comparison. Reference   is made with a previous abdominal CT dated 3/9/2022.    CONTRAST/COMPLICATIONS:  IV Contrast: NONE  Oral Contrast: NONE  Complications: None reported at time of study completion    PROCEDURE:  MRI of the abdomen was performed.  MRCP was performed.    FINDINGS:  LOWER CHEST: Small bilateral atelectasis.    LIVER: Within normal limits.  BILE DUCTS: Normal caliber. The common bile duct measures up to 6 mm in   caliber which is within normal limits. No evidence for   choledocholithiasis.  GALLBLADDER: Status post cholecystectomy. Mild edema in the gallbladder   fossa may be postsurgical in nature.  SPLEEN: Within normal limits.  PANCREAS: Within normal limits.  ADRENALS: Within normal limits.  KIDNEYS/URETERS: The left kidney appears unremarkable. Small brightly T2   hyperintense lesion in the right kidney, representing a probable cyst.    VISUALIZED PORTIONS:  BOWEL: Small hiatal hernia.  PERITONEUM: Trace perisplenic ascites  VESSELS: Within normal limits.  RETROPERITONEUM/LYMPH NODES: No lymphadenopathy.  ABDOMINAL WALL: Within normal limits.  BONES: Within normal limits.    IMPRESSION:  No evidence for choledocholithiasis.    Status post cholecystectomy. Mild edema in the gallbladder fossa may be   postsurgical in nature.    Trace perisplenic ascites.    --- End of Report ---      JAYLA REBOLLAR MD; Attending Radiologist  This document has been electronically signed. Mar 10 2022 11:40AM    < end of copied text >

## 2022-03-10 NOTE — PROGRESS NOTE ADULT - ASSESSMENT
51 y/.o woman with a h/o acute cholecystitis POD#2 s/p laparoscopic cholecystectomy complaining of mid abdominal pain and elevated LFTs    POD 2 cholecystectomy   Labs:  TBili  2.3, AST  294, , AlkPhos 194 (Note: LFTs unremarkable prior to cholecystectomy)    Differential for acute LFT elevation after cholecystectomy includes bile leak, biloma, retained stone.   LFTs improving, clinically improving   CT, MRCP without evidence of etiology of acute LFT elevation; no evidence of choledocholithiasis.     No indication for ERCP at this time. Please call with any questions.

## 2022-03-10 NOTE — PROGRESS NOTE ADULT - SUBJECTIVE AND OBJECTIVE BOX
Patient: MITCH ACOSTA [id number] 51y Female                            Hospitalist Attending Note    No complaints.   Seen with .  No Abdominal pain, nausea, vomiting, diarrhea, nor constipation.     ____________________PHYSICAL EXAM:  GENERAL:  NAD Alert and Oriented x 3   HEENT: NCAT  CARDIOVASCULAR:  S1, S2  LUNGS: CTAB  ABDOMEN:  soft, (-) tenderness, (-) distension, (+) bowel sounds, (-) guarding, (-) rebound (-) rigidity.  Healing laparoscopic incisions.   EXTREMITIES:  no cyanosis / clubbing / edema.   ____________________     VITALS:  Vital Signs Last 24 Hrs  T(C): 36.8 (10 Mar 2022 11:55), Max: 37.3 (09 Mar 2022 23:37)  T(F): 98.3 (10 Mar 2022 11:55), Max: 99.1 (09 Mar 2022 23:37)  HR: 70 (10 Mar 2022 11:55) (68 - 77)  BP: 108/75 (10 Mar 2022 11:55) (108/75 - 117/75)  BP(mean): --  RR: 18 (10 Mar 2022 11:55) (18 - 18)  SpO2: 96% (10 Mar 2022 11:55) (95% - 96%) Daily     Daily   CAPILLARY BLOOD GLUCOSE      POCT Blood Glucose.: 241 mg/dL (10 Mar 2022 16:27)  POCT Blood Glucose.: 170 mg/dL (10 Mar 2022 11:31)  POCT Blood Glucose.: 208 mg/dL (10 Mar 2022 08:48)  POCT Blood Glucose.: 120 mg/dL (09 Mar 2022 21:05)    I&O's Summary      HISTORY:  PAST MEDICAL & SURGICAL HISTORY:  Allergies    No Known Allergies    Intolerances       LABS:                        12.7   6.42  )-----------( 326      ( 10 Mar 2022 06:54 )             38.3     03-10    134<L>  |  104  |  5<L>  ----------------------------<  204<H>  3.9   |  22  |  0.62    Ca    9.1      10 Mar 2022 06:54    TPro  7.1  /  Alb  3.0<L>  /  TBili  1.7<H>  /  DBili  1.0<H>  /  AST  183<H>  /  ALT  210<H>  /  AlkPhos  213<H>  03-10      LIVER FUNCTIONS - ( 10 Mar 2022 06:54 )  Alb: 3.0 g/dL / Pro: 7.1 gm/dL / ALK PHOS: 213 U/L / ALT: 210 U/L / AST: 183 U/L / GGT: x                     MEDICATIONS:  MEDICATIONS  (STANDING):  acetaminophen     Tablet .. 650 milliGRAM(s) Oral every 6 hours  dextrose 40% Gel 15 Gram(s) Oral once  dextrose 5% + sodium chloride 0.45% with potassium chloride 20 mEq/L 1000 milliLiter(s) (135 mL/Hr) IV Continuous <Continuous>  dextrose 5%. 1000 milliLiter(s) (50 mL/Hr) IV Continuous <Continuous>  dextrose 5%. 1000 milliLiter(s) (100 mL/Hr) IV Continuous <Continuous>  dextrose 50% Injectable 25 Gram(s) IV Push once  dextrose 50% Injectable 12.5 Gram(s) IV Push once  dextrose 50% Injectable 25 Gram(s) IV Push once  glucagon  Injectable 1 milliGRAM(s) IntraMuscular once  heparin   Injectable 7500 Unit(s) SubCutaneous every 12 hours  influenza   Vaccine 0.5 milliLiter(s) IntraMuscular once  insulin glargine Injectable (LANTUS) 6 Unit(s) SubCutaneous at bedtime  insulin lispro (ADMELOG) corrective regimen sliding scale   SubCutaneous three times a day before meals  morphine  - Injectable 2 milliGRAM(s) IV Push once  nicotine -  14 mG/24Hr(s) Patch 1 patch Transdermal daily  pantoprazole  Injectable 40 milliGRAM(s) IV Push daily    MEDICATIONS  (PRN):  morphine  - Injectable 2 milliGRAM(s) IV Push every 6 hours PRN Severe Pain (7 - 10)  ondansetron Injectable 4 milliGRAM(s) IV Push every 6 hours PRN Nausea and/or Vomiting  oxyCODONE    IR 5 milliGRAM(s) Oral every 6 hours PRN Moderate Pain (4 - 6)

## 2022-03-10 NOTE — PROGRESS NOTE ADULT - SUBJECTIVE AND OBJECTIVE BOX
HPI: 51year old female POD #3 s/p lap cholecystectomy. LFT improved this morning. Examined seated in a chair.     Vital Signs Last 24 Hrs  T(C): 36.8 (10 Mar 2022 11:55), Max: 37.3 (09 Mar 2022 23:37)  T(F): 98.3 (10 Mar 2022 11:55), Max: 99.1 (09 Mar 2022 23:37)  HR: 70 (10 Mar 2022 11:55) (68 - 77)  BP: 108/75 (10 Mar 2022 11:55) (108/75 - 117/75)  BP(mean): --  RR: 18 (10 Mar 2022 11:55) (18 - 18)  SpO2: 96% (10 Mar 2022 11:55) (95% - 96%)    MEDICATIONS  (STANDING):  acetaminophen     Tablet .. 650 milliGRAM(s) Oral every 6 hours  dextrose 40% Gel 15 Gram(s) Oral once  dextrose 5% + sodium chloride 0.45% with potassium chloride 20 mEq/L 1000 milliLiter(s) (135 mL/Hr) IV Continuous <Continuous>  dextrose 5%. 1000 milliLiter(s) (50 mL/Hr) IV Continuous <Continuous>  dextrose 5%. 1000 milliLiter(s) (100 mL/Hr) IV Continuous <Continuous>  dextrose 50% Injectable 25 Gram(s) IV Push once  dextrose 50% Injectable 12.5 Gram(s) IV Push once  dextrose 50% Injectable 25 Gram(s) IV Push once  glucagon  Injectable 1 milliGRAM(s) IntraMuscular once  heparin   Injectable 7500 Unit(s) SubCutaneous every 12 hours  influenza   Vaccine 0.5 milliLiter(s) IntraMuscular once  insulin glargine Injectable (LANTUS) 6 Unit(s) SubCutaneous at bedtime  insulin lispro (ADMELOG) corrective regimen sliding scale   SubCutaneous three times a day before meals  morphine  - Injectable 2 milliGRAM(s) IV Push once  nicotine -  14 mG/24Hr(s) Patch 1 patch Transdermal daily  pantoprazole  Injectable 40 milliGRAM(s) IV Push daily  piperacillin/tazobactam IVPB.. 3.375 Gram(s) IV Intermittent every 8 hours    MEDICATIONS  (PRN):  morphine  - Injectable 2 milliGRAM(s) IV Push every 6 hours PRN Severe Pain (7 - 10)  ondansetron Injectable 4 milliGRAM(s) IV Push every 6 hours PRN Nausea and/or Vomiting  oxyCODONE    IR 5 milliGRAM(s) Oral every 6 hours PRN Moderate Pain (4 - 6)      PHYSICAL EXAM:      Constitutional: awake and alert.  HEENT: PERRLA, EOMI,   Neck: Supple.  Respiratory: Breath sounds are clear bilaterally  Cardiovascular: S1 and S2, regular   Gastrointestinal: soft, nontender +bsx4 steristrips intact not tender to palpation  Extremities:  no edema  Musculoskeletal: no joint swelling/tenderness, no abnormal movements  Skin: No rashes          LABS:                         12.7   6.42  )-----------( 326      ( 10 Mar 2022 06:54 )             38.3     03-10    134<L>  |  104  |  5<L>  ----------------------------<  204<H>  3.9   |  22  |  0.62    Ca    9.1      10 Mar 2022 06:54    TPro  7.1  /  Alb  3.0<L>  /  TBili  1.7<H>  /  DBili  1.0<H>  /  AST  183<H>  /  ALT  210<H>  /  AlkPhos  213<H>  03-10    LIVER FUNCTIONS - ( 10 Mar 2022 06:54 )  Alb: 3.0 g/dL / Pro: 7.1 gm/dL / ALK PHOS: 213 U/L / ALT: 210 U/L / AST: 183 U/L / GGT: x               RADIOLOGY:  < from: MR MRCP No Cont (03.10.22 @ 10:53) >  No evidence for choledocholithiasis.    Status post cholecystectomy. Mild edema in the gallbladder fossa may be   postsurgical in nature.    Trace perisplenic ascites.    --- End of Report ---    < end of copied text >

## 2022-03-10 NOTE — PROGRESS NOTE ADULT - ASSESSMENT
50 yo female PMH of "gastritis" admitted with epigastric pain, CT read as normal, but US showed gallstones.  Evaluated by surgery, symptoms felt to be consistent with acute on chronic cholecystitis.  S/p Lap pietro 3/7.   s/p course of Zosyn.  Noted with elevated LFTs after procedure.  MRCP not suggestive of choledocholithiasis.      # Acute on Chronic Cholecystitis - symptoms improved.  Elevated LFTs noted, MRCP not suggestive of choledocholithiasis.  Surgery following.  GI followup.  Advance diet.  LFTs are trending down.    # Diabetes Type II - monitor fingersticks.  Insulin coverage for hyperglycemia.  new diagnosis   A1c = 10.1   Consider oral hypoglycemics on discharge.  # DVT Proph - OOB.     Possible d/c in 24h pending repeat LFTs, surgery and GI followup.

## 2022-03-11 ENCOUNTER — TRANSCRIPTION ENCOUNTER (OUTPATIENT)
Age: 52
End: 2022-03-11

## 2022-03-11 VITALS
RESPIRATION RATE: 17 BRPM | HEART RATE: 71 BPM | TEMPERATURE: 98 F | OXYGEN SATURATION: 96 % | DIASTOLIC BLOOD PRESSURE: 74 MMHG | SYSTOLIC BLOOD PRESSURE: 103 MMHG

## 2022-03-11 LAB
ALBUMIN SERPL ELPH-MCNC: 3.1 G/DL — LOW (ref 3.3–5)
ALP SERPL-CCNC: 216 U/L — HIGH (ref 40–120)
ALT FLD-CCNC: 165 U/L — HIGH (ref 12–78)
ANION GAP SERPL CALC-SCNC: 7 MMOL/L — SIGNIFICANT CHANGE UP (ref 5–17)
AST SERPL-CCNC: 91 U/L — HIGH (ref 15–37)
BILIRUB SERPL-MCNC: 1 MG/DL — SIGNIFICANT CHANGE UP (ref 0.2–1.2)
BUN SERPL-MCNC: 3 MG/DL — LOW (ref 7–23)
CALCIUM SERPL-MCNC: 9 MG/DL — SIGNIFICANT CHANGE UP (ref 8.5–10.1)
CHLORIDE SERPL-SCNC: 106 MMOL/L — SIGNIFICANT CHANGE UP (ref 96–108)
CO2 SERPL-SCNC: 22 MMOL/L — SIGNIFICANT CHANGE UP (ref 22–31)
CREAT SERPL-MCNC: 0.54 MG/DL — SIGNIFICANT CHANGE UP (ref 0.5–1.3)
EGFR: 113 ML/MIN/1.73M2 — SIGNIFICANT CHANGE UP
FLUAV AG NPH QL: SIGNIFICANT CHANGE UP
FLUBV AG NPH QL: SIGNIFICANT CHANGE UP
GLUCOSE BLDC GLUCOMTR-MCNC: 185 MG/DL — HIGH (ref 70–99)
GLUCOSE BLDC GLUCOMTR-MCNC: 215 MG/DL — HIGH (ref 70–99)
GLUCOSE BLDC GLUCOMTR-MCNC: 237 MG/DL — HIGH (ref 70–99)
GLUCOSE SERPL-MCNC: 222 MG/DL — HIGH (ref 70–99)
HCT VFR BLD CALC: 38.7 % — SIGNIFICANT CHANGE UP (ref 34.5–45)
HGB BLD-MCNC: 12.9 G/DL — SIGNIFICANT CHANGE UP (ref 11.5–15.5)
MCHC RBC-ENTMCNC: 27.8 PG — SIGNIFICANT CHANGE UP (ref 27–34)
MCHC RBC-ENTMCNC: 33.3 G/DL — SIGNIFICANT CHANGE UP (ref 32–36)
MCV RBC AUTO: 83.4 FL — SIGNIFICANT CHANGE UP (ref 80–100)
NRBC # BLD: 0 /100 WBCS — SIGNIFICANT CHANGE UP (ref 0–0)
PLATELET # BLD AUTO: 358 K/UL — SIGNIFICANT CHANGE UP (ref 150–400)
POTASSIUM SERPL-MCNC: 4 MMOL/L — SIGNIFICANT CHANGE UP (ref 3.5–5.3)
POTASSIUM SERPL-SCNC: 4 MMOL/L — SIGNIFICANT CHANGE UP (ref 3.5–5.3)
PROT SERPL-MCNC: 7 GM/DL — SIGNIFICANT CHANGE UP (ref 6–8.3)
RBC # BLD: 4.64 M/UL — SIGNIFICANT CHANGE UP (ref 3.8–5.2)
RBC # FLD: 13.4 % — SIGNIFICANT CHANGE UP (ref 10.3–14.5)
SARS-COV-2 RNA SPEC QL NAA+PROBE: SIGNIFICANT CHANGE UP
SODIUM SERPL-SCNC: 135 MMOL/L — SIGNIFICANT CHANGE UP (ref 135–145)
WBC # BLD: 6.87 K/UL — SIGNIFICANT CHANGE UP (ref 3.8–10.5)
WBC # FLD AUTO: 6.87 K/UL — SIGNIFICANT CHANGE UP (ref 3.8–10.5)

## 2022-03-11 PROCEDURE — 99239 HOSP IP/OBS DSCHRG MGMT >30: CPT

## 2022-03-11 RX ORDER — METFORMIN HYDROCHLORIDE 850 MG/1
1 TABLET ORAL
Qty: 60 | Refills: 0
Start: 2022-03-11

## 2022-03-11 RX ADMIN — Medication 2: at 11:52

## 2022-03-11 RX ADMIN — PANTOPRAZOLE SODIUM 40 MILLIGRAM(S): 20 TABLET, DELAYED RELEASE ORAL at 15:58

## 2022-03-11 RX ADMIN — DEXTROSE MONOHYDRATE, SODIUM CHLORIDE, AND POTASSIUM CHLORIDE 135 MILLILITER(S): 50; .745; 4.5 INJECTION, SOLUTION INTRAVENOUS at 08:19

## 2022-03-11 RX ADMIN — Medication 1 PATCH: at 08:23

## 2022-03-11 RX ADMIN — Medication 1 PATCH: at 15:58

## 2022-03-11 RX ADMIN — Medication 2: at 16:46

## 2022-03-11 RX ADMIN — HEPARIN SODIUM 7500 UNIT(S): 5000 INJECTION INTRAVENOUS; SUBCUTANEOUS at 05:18

## 2022-03-11 RX ADMIN — Medication 1: at 08:19

## 2022-03-11 NOTE — DISCHARGE NOTE NURSING/CASE MANAGEMENT/SOCIAL WORK - PATIENT PORTAL LINK FT
You can access the FollowMyHealth Patient Portal offered by [facility] by registering at the following website: [email or web address]. By joining [employer]’s FollowMyHealth portal, you will also be able to view your health information using other applications (apps) compatible with our system.

## 2022-03-11 NOTE — PROGRESS NOTE ADULT - REASON FOR ADMISSION
Abdominal pain and nausea.
Acute Cholecystitis s/p Laparoscopic Cholecystectomy
Abdominal pain and nausea.

## 2022-03-11 NOTE — DISCHARGE NOTE NURSING/CASE MANAGEMENT/SOCIAL WORK - NSDCFUADDAPPT_GEN_ALL_CORE_FT
Follow up with Dr. Milan in 1-2 weeks. Please call to schedule an appointment.     Follow up with Dr. Arcos (GI) in 2 weeks. Please call to schedule an appointment.

## 2022-03-11 NOTE — PROGRESS NOTE ADULT - SUBJECTIVE AND OBJECTIVE BOX
SURGERY PROGRESS HPI:  Pt seen and examined at bedside. Pain is well controlled on pain medication. Pt denies complaints. No acute events overnight. Pt tolerating diet. Pt denies nausea and vomiting.  +BM/flatus. Voiding well. Pt denies chest pain, SOB, dizziness, fever, chills. Ambulating.      Vital Signs Last 24 Hrs  T(C): 36.9 (11 Mar 2022 12:00), Max: 37.1 (10 Mar 2022 23:15)  T(F): 98.4 (11 Mar 2022 12:00), Max: 98.7 (10 Mar 2022 23:15)  HR: 67 (11 Mar 2022 12:00) (66 - 96)  BP: 113/77 (11 Mar 2022 12:00) (110/75 - 138/92)  BP(mean): --  RR: 17 (11 Mar 2022 12:00) (17 - 18)  SpO2: 95% (11 Mar 2022 12:00) (95% - 97%)      PHYSICAL EXAM:    GENERAL: NAD  HEAD:  Atraumatic, Normocephalic  CHEST/LUNG: Clear to ausculation, bilaterally   HEART: RRR S1S2  ABDOMEN: non distended, +BS, soft, non tender, no guarding, dressings CDI, abdomen appropriately tender.    EXTREMITIES:  calf soft, non tender b/l    I&O's Detail    10 Mar 2022 07:01  -  11 Mar 2022 07:00  --------------------------------------------------------  IN:    Oral Fluid: 150 mL  Total IN: 150 mL    OUT:  Total OUT: 0 mL    Total NET: 150 mL          LABS:                        12.9   6.87  )-----------( 358      ( 11 Mar 2022 06:29 )             38.7     03-11    135  |  106  |  3<L>  ----------------------------<  222<H>  4.0   |  22  |  0.54    Ca    9.0      11 Mar 2022 06:29    TPro  7.0  /  Alb  3.1<L>  /  TBili  1.0  /  DBili  x   /  AST  91<H>  /  ALT  165<H>  /  AlkPhos  216<H>  03-11

## 2022-03-11 NOTE — PROGRESS NOTE ADULT - ASSESSMENT
51 year old female POD #4 s/p Lap pietro     LFT improving  tolerating diet  ambulating  +BM, flautus  Pt is cleared for discharge home by surgery

## 2022-03-11 NOTE — DISCHARGE NOTE NURSING/CASE MANAGEMENT/SOCIAL WORK - NSDCPEFALRISK_GEN_ALL_CORE
For information on Fall & Injury Prevention, visit: [email or web address] OR  [email or web address] OR  https://www.cdc.gov/steadi/patient.html

## 2022-03-12 LAB
CULTURE RESULTS: SIGNIFICANT CHANGE UP
CULTURE RESULTS: SIGNIFICANT CHANGE UP
SPECIMEN SOURCE: SIGNIFICANT CHANGE UP
SPECIMEN SOURCE: SIGNIFICANT CHANGE UP

## 2022-03-16 DIAGNOSIS — K80.00 CALCULUS OF GALLBLADDER WITH ACUTE CHOLECYSTITIS WITHOUT OBSTRUCTION: ICD-10-CM

## 2022-03-16 DIAGNOSIS — R79.89 OTHER SPECIFIED ABNORMAL FINDINGS OF BLOOD CHEMISTRY: ICD-10-CM

## 2022-03-16 DIAGNOSIS — E86.0 DEHYDRATION: ICD-10-CM

## 2022-03-16 DIAGNOSIS — R74.01 ELEVATION OF LEVELS OF LIVER TRANSAMINASE LEVELS: ICD-10-CM

## 2022-03-16 DIAGNOSIS — E87.6 HYPOKALEMIA: ICD-10-CM

## 2022-03-16 DIAGNOSIS — E11.65 TYPE 2 DIABETES MELLITUS WITH HYPERGLYCEMIA: ICD-10-CM

## 2022-03-16 DIAGNOSIS — E66.01 MORBID (SEVERE) OBESITY DUE TO EXCESS CALORIES: ICD-10-CM

## 2022-03-18 PROBLEM — Z00.00 ENCOUNTER FOR PREVENTIVE HEALTH EXAMINATION: Status: ACTIVE | Noted: 2022-03-18

## 2022-03-23 ENCOUNTER — APPOINTMENT (OUTPATIENT)
Dept: BARIATRICS | Facility: CLINIC | Age: 52
End: 2022-03-23

## 2022-03-23 VITALS
BODY MASS INDEX: 29.63 KG/M2 | DIASTOLIC BLOOD PRESSURE: 74 MMHG | SYSTOLIC BLOOD PRESSURE: 111 MMHG | WEIGHT: 167.25 LBS | HEIGHT: 63 IN | TEMPERATURE: 97 F | OXYGEN SATURATION: 98 % | HEART RATE: 82 BPM

## 2022-03-23 PROCEDURE — 99024 POSTOP FOLLOW-UP VISIT: CPT | Mod: 1L

## 2022-03-28 ENCOUNTER — APPOINTMENT (OUTPATIENT)
Dept: SURGERY | Facility: CLINIC | Age: 52
End: 2022-03-28

## 2023-03-14 NOTE — PATIENT PROFILE ADULT - PRO INTERPRETER NEED 2
received referral from nurse to help patient complete POA paperwork.  visited patient and patient is received in room laying in bed. Patient is alert and oriented.  spoke to patient about Advance Care planning and patient decided not to complete POA paperwork.  did life review of patient and  gathered that patient has had a kelly over addition with Alcohol. Patient has been through AA multiple times, but this current admission, patient is stating, \"I need to stop doing this and turn my life around.\" Patient has been reflective about the damage that drinking in doing. Patient stated that his mom  recently and that caused him to start drinking again. Patient stated that he will be going back to AA and complete the program.  celebrated with patient about this decision.  provided a supportive and calming presence.  will stand by for any additional support.     ALICE CANDELARIO, M.Div           English

## 2025-06-30 NOTE — BRIEF OPERATIVE NOTE - PRIMARY SURGEON
Pain injection instructions:     This procedure may take a couple weeks to relieve pain  You may get some pain relief from the local anesthetic initally.   Steroids can have side effects of flushed face or nervous feeling.    No driving for 24 hrs.   Activity as tolerated- gradually increase activities.  Dont lift over 10 lbs for 24 hrs   No heat at injection sites for 2 full days. No heating pads, hot tubs, saunas, or swimming in any body of water or pool for 2 full days.  Use ice pack for mild swelling and for comfort , apply for 20 minutes, remove for 20 minute intervals. No direct contact of ice itself  to skin.  May shower today.  Do not allow shower water to beat on injections site(s) for 2 full days. No tub baths for two full days.      Resume Aspirin, Plavix, or Coumadin the day after the procedure unless otherwise instructed.   If diabetic,monitor your glucose carefully as steroids can increase your glucose level    Seek immediate medical help for:     Severe increase in pain not relieved by medication or ice or any new pain in the region .    Prolonged (more than 24 hrs)or increasing weakness or numbness in the legs or arms. - it is normal to have weakness/numbness for up to 8 hrs.   Fever above 100 degrees F , Drainage,redness,active bleeding, or increased swelling at the injection site.  Headache that increases when sitting up, but decreases when lying down.  New shortness of breath, chest pain, or breathing problems.    After Surgery:  Always be aware that any surgery can cause these symptoms:    Pain- Medication can be prescribed for pain to decrease your pain but may not completely take your pain away. Over the Counter pain medicine my be enough and you can always use Ice and rest to help ease pain.    Bleeding- a little bleeding after a surgery is usually within normal.  If there is a lot of blood you need to notify your MD.  Emergency treatments of bleeding are cold application, elevation of the  bleeding site and compression.    Infection- Infection after surgery is NOT a normal occurrence.  Signs of infection are fever, swelling, hot to touch the incision.  If this occurs notify your MD immediately.    Nausea- this can be common after a surgery especially if you have had anesthesia medicine or are taking pain medicine.  Steroids have a side effect of nausea sometimes. Staying on clear liquids, bland foods, gingerale, or over the counter anti nausea medicines can help.  If you vomit more than once, notify your MD.  Anti Nausea medicines can be prescribed.   Carl